# Patient Record
Sex: FEMALE | Race: WHITE | Employment: UNEMPLOYED | URBAN - METROPOLITAN AREA
[De-identification: names, ages, dates, MRNs, and addresses within clinical notes are randomized per-mention and may not be internally consistent; named-entity substitution may affect disease eponyms.]

---

## 2021-04-05 ENCOUNTER — IMMUNIZATION (OUTPATIENT)
Dept: PRIMARY CARE CLINIC | Age: 36
End: 2021-04-05
Payer: MEDICARE

## 2021-04-05 PROCEDURE — 0001A COVID-19, PFIZER VACCINE 30MCG/0.3ML DOSE: CPT | Performed by: CLINICAL NURSE SPECIALIST

## 2021-04-05 PROCEDURE — 91300 COVID-19, PFIZER VACCINE 30MCG/0.3ML DOSE: CPT | Performed by: CLINICAL NURSE SPECIALIST

## 2021-05-03 ENCOUNTER — IMMUNIZATION (OUTPATIENT)
Dept: PRIMARY CARE CLINIC | Age: 36
End: 2021-05-03
Payer: MEDICARE

## 2021-05-03 PROCEDURE — 91300 COVID-19, PFIZER VACCINE 30MCG/0.3ML DOSE: CPT | Performed by: NURSE PRACTITIONER

## 2021-05-03 PROCEDURE — 0002A COVID-19, PFIZER VACCINE 30MCG/0.3ML DOSE: CPT | Performed by: NURSE PRACTITIONER

## 2023-01-13 ENCOUNTER — HOSPITAL ENCOUNTER (OUTPATIENT)
Age: 38
Discharge: HOME | End: 2023-01-15

## 2023-01-13 PROCEDURE — 88305 TISSUE EXAM BY PATHOLOGIST: CPT

## 2023-01-13 PROCEDURE — 88342 IMHCHEM/IMCYTCHM 1ST ANTB: CPT

## 2023-01-20 ENCOUNTER — HOSPITAL ENCOUNTER (EMERGENCY)
Age: 38
Discharge: ANOTHER HOSPITAL | End: 2023-01-20
Attending: STUDENT IN AN ORGANIZED HEALTH CARE EDUCATION/TRAINING PROGRAM
Payer: MEDICARE

## 2023-01-20 ENCOUNTER — APPOINTMENT (OUTPATIENT)
Dept: GENERAL RADIOLOGY | Age: 38
End: 2023-01-20
Payer: MEDICARE

## 2023-01-20 VITALS
BODY MASS INDEX: 29.81 KG/M2 | DIASTOLIC BLOOD PRESSURE: 69 MMHG | HEART RATE: 78 BPM | WEIGHT: 162 LBS | TEMPERATURE: 98.8 F | HEIGHT: 62 IN | RESPIRATION RATE: 15 BRPM | SYSTOLIC BLOOD PRESSURE: 121 MMHG | OXYGEN SATURATION: 96 %

## 2023-01-20 DIAGNOSIS — S92.352A CLOSED FRACTURE OF BASE OF FIFTH METATARSAL BONE OF LEFT FOOT, INITIAL ENCOUNTER: ICD-10-CM

## 2023-01-20 DIAGNOSIS — S82.392A CLOSED INTRA-ARTICULAR FRACTURE OF DISTAL END OF LEFT TIBIA, INITIAL ENCOUNTER: Primary | ICD-10-CM

## 2023-01-20 PROCEDURE — 29515 APPLICATION SHORT LEG SPLINT: CPT

## 2023-01-20 PROCEDURE — 73610 X-RAY EXAM OF ANKLE: CPT

## 2023-01-20 PROCEDURE — 99285 EMERGENCY DEPT VISIT HI MDM: CPT

## 2023-01-20 PROCEDURE — 73630 X-RAY EXAM OF FOOT: CPT

## 2023-01-20 PROCEDURE — 6370000000 HC RX 637 (ALT 250 FOR IP): Performed by: PHYSICIAN ASSISTANT

## 2023-01-20 PROCEDURE — 73590 X-RAY EXAM OF LOWER LEG: CPT

## 2023-01-20 RX ORDER — OXYCODONE HYDROCHLORIDE AND ACETAMINOPHEN 5; 325 MG/1; MG/1
1 TABLET ORAL ONCE
Status: COMPLETED | OUTPATIENT
Start: 2023-01-20 | End: 2023-01-20

## 2023-01-20 RX ORDER — ACETAMINOPHEN 500 MG
1000 TABLET ORAL ONCE
Status: COMPLETED | OUTPATIENT
Start: 2023-01-20 | End: 2023-01-20

## 2023-01-20 RX ADMIN — OXYCODONE AND ACETAMINOPHEN 1 TABLET: 5; 325 TABLET ORAL at 21:45

## 2023-01-20 RX ADMIN — ACETAMINOPHEN 1000 MG: 500 TABLET ORAL at 16:02

## 2023-01-20 ASSESSMENT — PAIN DESCRIPTION - FREQUENCY: FREQUENCY: CONTINUOUS

## 2023-01-20 ASSESSMENT — PAIN - FUNCTIONAL ASSESSMENT: PAIN_FUNCTIONAL_ASSESSMENT: 0-10

## 2023-01-20 ASSESSMENT — PAIN DESCRIPTION - PAIN TYPE: TYPE: ACUTE PAIN

## 2023-01-20 ASSESSMENT — PAIN DESCRIPTION - ORIENTATION
ORIENTATION: LEFT
ORIENTATION: LEFT

## 2023-01-20 ASSESSMENT — PAIN SCALES - GENERAL
PAINLEVEL_OUTOF10: 8
PAINLEVEL_OUTOF10: 7
PAINLEVEL_OUTOF10: 8

## 2023-01-20 ASSESSMENT — PAIN DESCRIPTION - LOCATION
LOCATION: ANKLE
LOCATION: ANKLE

## 2023-01-20 ASSESSMENT — PAIN DESCRIPTION - DESCRIPTORS: DESCRIPTORS: SHARP

## 2023-01-20 NOTE — ED PROVIDER NOTES
Shared CLAUDE-ED Attending Visit. CC: No           [facility]  Department of Emergency Medicine   ED  Encounter Note  Admit Date/RoomTime: 2023  3:37 PM  ED Room: [place] [address]    NAME: Tabitha Del Castillo  : 1985  MRN: [id number]     Chief Complaint:  Fall (Mechanical fall yesterday and laid on the left side, denies hit head, No blood thinner  ) and Ankle Pain (Fell and hurt left the ankle , swelling and can't put weight on it since then )    History of Present Illness   Patient presented to ER accompanied by mother whom helps to act as historian based on patient's pre-existing developmental delay of Down syndrome. However, patient is verbal and able to answer questions. Tabitha Del Castillo is a 30 y.o. old female presenting to the emergency department by private vehicle, for non-traumatic Left ankle pain which occured 1 day(s) prior to arrival.  The complaint is due to stepping on a dog bone at home and twisting her ankle yesterday evening. Since onset the symptoms have been gradually worsening with inability to bear weight, stiffness, and swelling. Patient has no prior history of pain/injury with regards to today's visit. Her pain is aggraveated by certain movements or pressure on or palpation of painful area and relieved by OTC NSAIDS. She denies any head injury, headache, loss of consciousness, chest pain, abdominal pain, back pain, numbness, weakness, nausea, vomiting, fever, chills, wounds, or rash. Tetanus Status: up to date. ROS   Pertinent positives and negatives are stated within HPI, all other systems reviewed and are negative. Past Medical History:  has a past medical history of Chest discomfort, Down syndrome, Heartburn, and History of open heart surgery. Surgical History:  has a past surgical history that includes Cardiac surgery; Cholecystectomy; knee surgery; Tonsillectomy; and Cardiac valuve replacement ().     Social History:  reports that she has never smoked. She has never used smokeless tobacco. She reports that she does not drink alcohol and does not use drugs. Family History: family history includes Other in her father. Allergies: Patient has no known allergies. Physical Exam   Oxygen Saturation Interpretation: Normal.        ED Triage Vitals   BP Temp Temp Source Heart Rate Resp SpO2 Height Weight   01/20/23 1552 01/20/23 1555 01/20/23 1555 01/20/23 1552 01/20/23 1552 01/20/23 1552 01/20/23 1554 01/20/23 1554   (!) 124/94 98.8 °F (37.1 °C) Oral 87 15 100 % 5' 2\" (1.575 m) 162 lb (73.5 kg)       Physical Exam  Constitutional:  Alert, development consistent with age. Neck:  Normal ROM. Supple. Left Ankle: Lateral malleolus              Tenderness:  moderate. Swelling: Mild. Deformity: no deformity observed/palpated. ROM: diminished range with pain. Skin:  no wounds or erythema. Neurovascular: Motor deficit: none. Sensory deficit:   none. Pulse deficit: none. 2+ dorsalis pedis and posterior tibial pulses intact. Capillary refill: normal.  Is warm and well-perfused. Left Knee:              Tenderness:  none. Swelling: none. Deformity: no deformity observed/palpated. ROM: full range of motion. Skin:  no wounds, erythema, or swelling. Left Foot: fifth metatarsal dorsal and plantar aspect proximally              Tenderness:  moderate. Swelling: Mild. Deformity: no deformity observed/palpated. ROM: full range with pain. Skin:  no wounds or erythema  Gait:  unable to test due pain. Lymphatics: No lymphangitis or adenopathy noted. Neurological:  Oriented. Motor functions intact. Lab / Imaging Results   (All laboratory and radiology results have been personally reviewed by myself)  Labs:  No results found for this visit on 01/20/23. Imaging:   All Radiology results interpreted by Radiologist unless otherwise noted. XR TIBIA FIBULA LEFT (2 VIEWS)   Final Result   Soft tissue swelling at the ankle, oblique to spiral distal tibial fracture   with intra-articular extension. XR ANKLE LEFT (MIN 3 VIEWS)   Final Result   Oblique fracture through the distal fibula, suspicious for intra-articular   involvement. XR FOOT LEFT (MIN 3 VIEWS)   Final Result   No acute bony abnormalities. Partially visualized distal tibial fracture. ED Course / Medical Decision Making     Medications   acetaminophen (TYLENOL) tablet 1,000 mg (1,000 mg Oral Given 1/20/23 1602)   oxyCODONE-acetaminophen (PERCOCET) 5-325 MG per tablet 1 tablet (1 tablet Oral Given 1/20/23 8595)     Reevaluation:  Time: 530 PM  Results discussed with patient's mother and patient at bedside at this time as well as need for consult to orthopedic surgeon for recommendation. It was discussed via myself and the patient's mother at this time that patient's mother did not feel safe taking her home and caring for her if she has to be nonweightbearing as they do not have a wheelchair and there are steps at home. Patient cannot ambulate with crutches as she is a fall risk per mother. I am agreeable to this. Plan for consult to orthopedic surgeon for possible admit with follow-up in the hospital.    Time: 6:50 PM  Spoke with OR charge nurse, Benjamin Puentes, via telephone at this time whom did get in contact with Dr. Misael Mahoney as a surgical nurse as he is in a surgical case at this time. She was able to convey some information to the nurse however Dr. Denys Day stated that he would like to call back after surgery was completed for further recommendation on this case. Patient updated. Time: 8:30 PM  I spoke with Dr. Ryan Fuentes via telephone at this time for recommendation on this case.   Due to the patient's fracture as well as the likelihood of her to remain nonweightbearing with an external fixation being unlikely, Dr. Marlene Darnell did recommend transfer downtown for trauma consult at this time. Time: 9:01 PM  Spoke with the resident orthopedic provider for [facility] via telephone at this time. He did recommend transfer ED to ED for trauma consult through the ER as they do not cover for [facility]. Although, I did explain the situation with the patient being MRI with unlikelihood to remain nonweightbearing and recommendation transfer downtown to Ortho. Will update my attending and patient at this time. Time: 9:48 PM  Spoke with attending physician, Dr. Joe Hillman, at [facility] to discuss ED to ED transfer with trauma consult via orthopedics once the patient is downtown. Attending did verbally accept ED to ED transfer. Transfer line will set up BLS transport. Consults:   IP CONSULT TO ORTHOPEDIC SURGERY    Procedure(s):  PROCEDURE NOTE  1/20/23       Time: 6:23SV    SPLINT  APPLICATION  Risks, benefits and alternatives (for applicable procedures below) described. Performed By: nursing shaft supervised via myself. Indication:  fracture of left, distal tibia with intra-articular involvement and base of fifth metatarsal fracture. Procedure:   A short  left leg  Posterior (three-sided) splint was applied by RN supervised via myself. Patient is neurovascularly intact following procedure. The patient tolerated the procedure well. MDM:       Patient is a 72-year-old female who presented to the ER via ambulance for left ankle pain sustained 1 day prior to arrival due to rolling injury at home. Patient did present with her mother who also acts as historian as patient does have history of MR. Patient also has significant past medical history of valve replacement, cholecystectomy. Based on reported history and physical exam findings differentials include ankle fracture, foot fracture, sprained ankle, sprain foot, contusion.   X-ray imaging of the patient's left foot demonstrated no acute bony abnormalities with a partially visualized distal tibial fracture as read by radiology. However, on wet read via myself I did appreciate a fifth metatarsal fracture consistent with Concepcion fracture. This is consistent with patient's physical exam findings. X imaging of the left ankle and left tib-fib demonstrated oblique to spiral distal tibial fracture with intra-articular extension with lateral soft tissue swelling as read by radiology. All results were discussed with patient and patient's mother prior to disposition all questions were answered. Several consults were placed out to both orthopedics and ED attending at [facility], as noted above, for further evaluation of the patient. Patient was ultimately transferred ED to ED for trauma consult within the ER at [facility] for recommendation of orthopedics with verbal acceptance via ED attending at [facility]. Patient was placed in a splint via nursing staff supervised by myself for support. Patient is alert and oriented, no acute distress, afebrile, nontachycardic, nonhypoxic and nontachypneic. Patient stable upon transfer order placement. Plan of Care/Counseling:  LG Guan and EM Attending Physician reviewed today's visit with the patient and mother in addition to providing specific details for the plan of care and counseling regarding the diagnosis and prognosis. Questions are answered at this time and are agreeable with the plan. Assessment      1. Closed intra-articular fracture of distal end of left tibia, initial encounter    2. Closed fracture of base of fifth metatarsal bone of left foot, initial encounter      Plan   Transferred to [address] [place] [address] Emergency Department (ER-to-ER).   Patient condition is stable    New Medications     New Prescriptions    No medications on file     Electronically signed by LG Guan   DD: 1/20/23  **This report was transcribed using voice recognition software. Every effort was made to ensure accuracy; however, inadvertent computerized transcription errors may be present.   END OF ED PROVIDER NOTE      Verner Lyons, [place]  01/20/23 35 Alfredo Asif [place]  01/20/23 2145

## 2023-01-20 NOTE — ED NOTES
Ice bag applied        Anuradha Baxter, RN  01/20/23 771 [facility], RN  01/20/23 3545

## 2023-01-21 ENCOUNTER — HOSPITAL ENCOUNTER (INPATIENT)
Age: 38
LOS: 1 days | Discharge: NURSING, REHAB OR LONG-TERM CARE | DRG: 494 | End: 2023-01-25
Attending: EMERGENCY MEDICINE | Admitting: ORTHOPAEDIC SURGERY
Payer: MEDICARE

## 2023-01-21 ENCOUNTER — ANESTHESIA EVENT (OUTPATIENT)
Dept: OPERATING ROOM | Age: 38
End: 2023-01-21
Payer: MEDICARE

## 2023-01-21 ENCOUNTER — APPOINTMENT (OUTPATIENT)
Dept: GENERAL RADIOLOGY | Age: 38
DRG: 494 | End: 2023-01-21
Payer: MEDICARE

## 2023-01-21 ENCOUNTER — APPOINTMENT (OUTPATIENT)
Dept: CT IMAGING | Age: 38
DRG: 494 | End: 2023-01-21
Payer: MEDICARE

## 2023-01-21 ENCOUNTER — ANESTHESIA (OUTPATIENT)
Dept: OPERATING ROOM | Age: 38
End: 2023-01-21
Payer: MEDICARE

## 2023-01-21 DIAGNOSIS — S82.402A CLOSED FRACTURE OF LEFT TIBIA AND FIBULA, INITIAL ENCOUNTER: Primary | ICD-10-CM

## 2023-01-21 DIAGNOSIS — S82.202A CLOSED FRACTURE OF LEFT TIBIA AND FIBULA, INITIAL ENCOUNTER: Primary | ICD-10-CM

## 2023-01-21 PROBLEM — S82.871A CLOSED DISPLACED PILON FRACTURE OF RIGHT TIBIA: Status: ACTIVE | Noted: 2023-01-21

## 2023-01-21 PROBLEM — S82.872A CLOSED LEFT PILON FRACTURE, INITIAL ENCOUNTER: Status: ACTIVE | Noted: 2023-01-21

## 2023-01-21 LAB
ABO/RH: NORMAL
ABO/RH: NORMAL
ANTIBODY SCREEN: NORMAL
APTT: 32.1 SEC (ref 24.5–35.1)
HCG QUALITATIVE: NEGATIVE
HCT VFR BLD CALC: 36 % (ref 34–48)
HEMOGLOBIN: 12 G/DL (ref 11.5–15.5)
INR BLD: 1.1
MCH RBC QN AUTO: 29.7 PG (ref 26–35)
MCHC RBC AUTO-ENTMCNC: 33.3 % (ref 32–34.5)
MCV RBC AUTO: 89.1 FL (ref 80–99.9)
PDW BLD-RTO: 14.6 FL (ref 11.5–15)
PLATELET # BLD: 298 E9/L (ref 130–450)
PMV BLD AUTO: 9.8 FL (ref 7–12)
PROTHROMBIN TIME: 12.4 SEC (ref 9.3–12.4)
RBC # BLD: 4.04 E12/L (ref 3.5–5.5)
WBC # BLD: 5.9 E9/L (ref 4.5–11.5)

## 2023-01-21 PROCEDURE — C1713 ANCHOR/SCREW BN/BN,TIS/BN: HCPCS | Performed by: ORTHOPAEDIC SURGERY

## 2023-01-21 PROCEDURE — 6360000002 HC RX W HCPCS: Performed by: ANESTHESIOLOGY

## 2023-01-21 PROCEDURE — 6360000002 HC RX W HCPCS: Performed by: EMERGENCY MEDICINE

## 2023-01-21 PROCEDURE — 73700 CT LOWER EXTREMITY W/O DYE: CPT

## 2023-01-21 PROCEDURE — 86900 BLOOD TYPING SEROLOGIC ABO: CPT

## 2023-01-21 PROCEDURE — 73590 X-RAY EXAM OF LOWER LEG: CPT

## 2023-01-21 PROCEDURE — 84703 CHORIONIC GONADOTROPIN ASSAY: CPT

## 2023-01-21 PROCEDURE — 6360000002 HC RX W HCPCS

## 2023-01-21 PROCEDURE — 7100000001 HC PACU RECOVERY - ADDTL 15 MIN: Performed by: ORTHOPAEDIC SURGERY

## 2023-01-21 PROCEDURE — 3600000015 HC SURGERY LEVEL 5 ADDTL 15MIN: Performed by: ORTHOPAEDIC SURGERY

## 2023-01-21 PROCEDURE — 6370000000 HC RX 637 (ALT 250 FOR IP): Performed by: EMERGENCY MEDICINE

## 2023-01-21 PROCEDURE — 6360000002 HC RX W HCPCS: Performed by: STUDENT IN AN ORGANIZED HEALTH CARE EDUCATION/TRAINING PROGRAM

## 2023-01-21 PROCEDURE — 0QSH04Z REPOSITION LEFT TIBIA WITH INTERNAL FIXATION DEVICE, OPEN APPROACH: ICD-10-PCS | Performed by: ORTHOPAEDIC SURGERY

## 2023-01-21 PROCEDURE — 3600000005 HC SURGERY LEVEL 5 BASE: Performed by: ORTHOPAEDIC SURGERY

## 2023-01-21 PROCEDURE — 85730 THROMBOPLASTIN TIME PARTIAL: CPT

## 2023-01-21 PROCEDURE — G0378 HOSPITAL OBSERVATION PER HR: HCPCS

## 2023-01-21 PROCEDURE — 3700000000 HC ANESTHESIA ATTENDED CARE: Performed by: ORTHOPAEDIC SURGERY

## 2023-01-21 PROCEDURE — 99223 1ST HOSP IP/OBS HIGH 75: CPT | Performed by: ORTHOPAEDIC SURGERY

## 2023-01-21 PROCEDURE — 6370000000 HC RX 637 (ALT 250 FOR IP): Performed by: ORTHOPAEDIC SURGERY

## 2023-01-21 PROCEDURE — 2580000003 HC RX 258

## 2023-01-21 PROCEDURE — 85610 PROTHROMBIN TIME: CPT

## 2023-01-21 PROCEDURE — 2580000003 HC RX 258: Performed by: STUDENT IN AN ORGANIZED HEALTH CARE EDUCATION/TRAINING PROGRAM

## 2023-01-21 PROCEDURE — 86850 RBC ANTIBODY SCREEN: CPT

## 2023-01-21 PROCEDURE — 7100000000 HC PACU RECOVERY - FIRST 15 MIN: Performed by: ORTHOPAEDIC SURGERY

## 2023-01-21 PROCEDURE — 85027 COMPLETE CBC AUTOMATED: CPT

## 2023-01-21 PROCEDURE — 2720000010 HC SURG SUPPLY STERILE: Performed by: ORTHOPAEDIC SURGERY

## 2023-01-21 PROCEDURE — 96374 THER/PROPH/DIAG INJ IV PUSH: CPT

## 2023-01-21 PROCEDURE — 86901 BLOOD TYPING SEROLOGIC RH(D): CPT

## 2023-01-21 PROCEDURE — 3209999900 FLUORO FOR SURGICAL PROCEDURES

## 2023-01-21 PROCEDURE — 6370000000 HC RX 637 (ALT 250 FOR IP): Performed by: STUDENT IN AN ORGANIZED HEALTH CARE EDUCATION/TRAINING PROGRAM

## 2023-01-21 PROCEDURE — 3700000001 HC ADD 15 MINUTES (ANESTHESIA): Performed by: ORTHOPAEDIC SURGERY

## 2023-01-21 PROCEDURE — 27827 TREAT LOWER LEG FRACTURE: CPT | Performed by: ORTHOPAEDIC SURGERY

## 2023-01-21 PROCEDURE — 2709999900 HC NON-CHARGEABLE SUPPLY: Performed by: ORTHOPAEDIC SURGERY

## 2023-01-21 PROCEDURE — 73610 X-RAY EXAM OF ANKLE: CPT

## 2023-01-21 PROCEDURE — 02HV33Z INSERTION OF INFUSION DEVICE INTO SUPERIOR VENA CAVA, PERCUTANEOUS APPROACH: ICD-10-PCS | Performed by: ORTHOPAEDIC SURGERY

## 2023-01-21 PROCEDURE — 99285 EMERGENCY DEPT VISIT HI MDM: CPT

## 2023-01-21 PROCEDURE — 2500000003 HC RX 250 WO HCPCS

## 2023-01-21 DEVICE — IMPLANTABLE DEVICE: Type: IMPLANTABLE DEVICE | Site: TIBIA | Status: FUNCTIONAL

## 2023-01-21 DEVICE — SCREW BNE L28MM DIA3.5MM CORT S STL ST NONCANNULATED LOK: Type: IMPLANTABLE DEVICE | Site: TIBIA | Status: FUNCTIONAL

## 2023-01-21 DEVICE — SCREW BNE L22MM DIA3.5MM CORT S STL ST NONCANNULATED LOK: Type: IMPLANTABLE DEVICE | Site: TIBIA | Status: FUNCTIONAL

## 2023-01-21 DEVICE — SCREW BNE L24MM DIA3.5MM CORT S STL ST NONCANNULATED LOK: Type: IMPLANTABLE DEVICE | Site: TIBIA | Status: FUNCTIONAL

## 2023-01-21 DEVICE — SCREW BNE L30MM DIA3.5MM CORT S STL ST LOK FULL THRD: Type: IMPLANTABLE DEVICE | Site: TIBIA | Status: FUNCTIONAL

## 2023-01-21 DEVICE — SCREW BNE L26MM DIA3.5MM CORT S STL ST NONCANNULATED LOK: Type: IMPLANTABLE DEVICE | Site: TIBIA | Status: FUNCTIONAL

## 2023-01-21 DEVICE — SCREW BNE L14MM DIA3.5MM CORT S STL ST LOK FULL THRD: Type: IMPLANTABLE DEVICE | Site: TIBIA | Status: FUNCTIONAL

## 2023-01-21 RX ORDER — SODIUM CHLORIDE 0.9 % (FLUSH) 0.9 %
5-40 SYRINGE (ML) INJECTION EVERY 12 HOURS SCHEDULED
Status: DISCONTINUED | OUTPATIENT
Start: 2023-01-21 | End: 2023-01-21 | Stop reason: HOSPADM

## 2023-01-21 RX ORDER — FENTANYL CITRATE 50 UG/ML
INJECTION, SOLUTION INTRAMUSCULAR; INTRAVENOUS PRN
Status: DISCONTINUED | OUTPATIENT
Start: 2023-01-21 | End: 2023-01-21 | Stop reason: SDUPTHER

## 2023-01-21 RX ORDER — LIDOCAINE HYDROCHLORIDE 20 MG/ML
INJECTION, SOLUTION INTRAVENOUS PRN
Status: DISCONTINUED | OUTPATIENT
Start: 2023-01-21 | End: 2023-01-21 | Stop reason: SDUPTHER

## 2023-01-21 RX ORDER — NEOSTIGMINE METHYLSULFATE 1 MG/ML
INJECTION, SOLUTION INTRAVENOUS PRN
Status: DISCONTINUED | OUTPATIENT
Start: 2023-01-21 | End: 2023-01-21 | Stop reason: SDUPTHER

## 2023-01-21 RX ORDER — SODIUM CHLORIDE 0.9 % (FLUSH) 0.9 %
5-40 SYRINGE (ML) INJECTION PRN
Status: DISCONTINUED | OUTPATIENT
Start: 2023-01-21 | End: 2023-01-25 | Stop reason: HOSPADM

## 2023-01-21 RX ORDER — MORPHINE SULFATE 2 MG/ML
2 INJECTION, SOLUTION INTRAMUSCULAR; INTRAVENOUS
Status: DISCONTINUED | OUTPATIENT
Start: 2023-01-21 | End: 2023-01-25 | Stop reason: HOSPADM

## 2023-01-21 RX ORDER — DEXAMETHASONE SODIUM PHOSPHATE 10 MG/ML
INJECTION INTRAMUSCULAR; INTRAVENOUS PRN
Status: DISCONTINUED | OUTPATIENT
Start: 2023-01-21 | End: 2023-01-21 | Stop reason: SDUPTHER

## 2023-01-21 RX ORDER — ASPIRIN 81 MG/1
81 TABLET ORAL 2 TIMES DAILY
Qty: 56 TABLET | Refills: 0 | Status: SHIPPED | OUTPATIENT
Start: 2023-01-21 | End: 2023-02-18

## 2023-01-21 RX ORDER — SODIUM CHLORIDE 0.9 % (FLUSH) 0.9 %
5-40 SYRINGE (ML) INJECTION PRN
Status: DISCONTINUED | OUTPATIENT
Start: 2023-01-21 | End: 2023-01-21 | Stop reason: HOSPADM

## 2023-01-21 RX ORDER — KETOROLAC TROMETHAMINE 30 MG/ML
15 INJECTION, SOLUTION INTRAMUSCULAR; INTRAVENOUS ONCE
Status: COMPLETED | OUTPATIENT
Start: 2023-01-21 | End: 2023-01-21

## 2023-01-21 RX ORDER — ONDANSETRON 2 MG/ML
4 INJECTION INTRAMUSCULAR; INTRAVENOUS
Status: COMPLETED | OUTPATIENT
Start: 2023-01-21 | End: 2023-01-21

## 2023-01-21 RX ORDER — ASPIRIN 81 MG/1
81 TABLET, CHEWABLE ORAL DAILY
Status: DISCONTINUED | OUTPATIENT
Start: 2023-01-21 | End: 2023-01-25 | Stop reason: HOSPADM

## 2023-01-21 RX ORDER — SODIUM CHLORIDE 9 MG/ML
INJECTION, SOLUTION INTRAVENOUS PRN
Status: DISCONTINUED | OUTPATIENT
Start: 2023-01-21 | End: 2023-01-21 | Stop reason: HOSPADM

## 2023-01-21 RX ORDER — PROPOFOL 10 MG/ML
INJECTION, EMULSION INTRAVENOUS PRN
Status: DISCONTINUED | OUTPATIENT
Start: 2023-01-21 | End: 2023-01-21 | Stop reason: SDUPTHER

## 2023-01-21 RX ORDER — OXYCODONE HYDROCHLORIDE AND ACETAMINOPHEN 5; 325 MG/1; MG/1
1 TABLET ORAL EVERY 6 HOURS PRN
Qty: 28 TABLET | Refills: 0 | Status: SHIPPED | OUTPATIENT
Start: 2023-01-21 | End: 2023-01-28

## 2023-01-21 RX ORDER — ONDANSETRON 2 MG/ML
INJECTION INTRAMUSCULAR; INTRAVENOUS PRN
Status: DISCONTINUED | OUTPATIENT
Start: 2023-01-21 | End: 2023-01-21 | Stop reason: SDUPTHER

## 2023-01-21 RX ORDER — ROCURONIUM BROMIDE 10 MG/ML
INJECTION, SOLUTION INTRAVENOUS PRN
Status: DISCONTINUED | OUTPATIENT
Start: 2023-01-21 | End: 2023-01-21 | Stop reason: SDUPTHER

## 2023-01-21 RX ORDER — GLYCOPYRROLATE 0.2 MG/ML
INJECTION INTRAMUSCULAR; INTRAVENOUS PRN
Status: DISCONTINUED | OUTPATIENT
Start: 2023-01-21 | End: 2023-01-21 | Stop reason: SDUPTHER

## 2023-01-21 RX ORDER — OXYCODONE HYDROCHLORIDE 5 MG/1
5 TABLET ORAL EVERY 4 HOURS PRN
Status: DISCONTINUED | OUTPATIENT
Start: 2023-01-21 | End: 2023-01-25 | Stop reason: HOSPADM

## 2023-01-21 RX ORDER — ACETAMINOPHEN 325 MG/1
650 TABLET ORAL EVERY 6 HOURS
Status: DISCONTINUED | OUTPATIENT
Start: 2023-01-21 | End: 2023-01-25 | Stop reason: HOSPADM

## 2023-01-21 RX ORDER — SODIUM CHLORIDE 9 MG/ML
INJECTION, SOLUTION INTRAVENOUS CONTINUOUS PRN
Status: DISCONTINUED | OUTPATIENT
Start: 2023-01-21 | End: 2023-01-21 | Stop reason: SDUPTHER

## 2023-01-21 RX ORDER — PANTOPRAZOLE SODIUM 40 MG/1
40 TABLET, DELAYED RELEASE ORAL
Status: DISCONTINUED | OUTPATIENT
Start: 2023-01-22 | End: 2023-01-25 | Stop reason: HOSPADM

## 2023-01-21 RX ORDER — ONDANSETRON 4 MG/1
4 TABLET, ORALLY DISINTEGRATING ORAL EVERY 8 HOURS PRN
Status: DISCONTINUED | OUTPATIENT
Start: 2023-01-21 | End: 2023-01-25 | Stop reason: HOSPADM

## 2023-01-21 RX ORDER — SODIUM CHLORIDE 0.9 % (FLUSH) 0.9 %
5-40 SYRINGE (ML) INJECTION EVERY 12 HOURS SCHEDULED
Status: DISCONTINUED | OUTPATIENT
Start: 2023-01-21 | End: 2023-01-25 | Stop reason: HOSPADM

## 2023-01-21 RX ORDER — ONDANSETRON 2 MG/ML
4 INJECTION INTRAMUSCULAR; INTRAVENOUS EVERY 6 HOURS PRN
Status: DISCONTINUED | OUTPATIENT
Start: 2023-01-21 | End: 2023-01-25 | Stop reason: HOSPADM

## 2023-01-21 RX ORDER — BACITRACIN ZINC 500 [USP'U]/G
OINTMENT TOPICAL PRN
Status: DISCONTINUED | OUTPATIENT
Start: 2023-01-21 | End: 2023-01-21 | Stop reason: ALTCHOICE

## 2023-01-21 RX ORDER — SODIUM CHLORIDE 9 MG/ML
INJECTION, SOLUTION INTRAVENOUS PRN
Status: DISCONTINUED | OUTPATIENT
Start: 2023-01-21 | End: 2023-01-25 | Stop reason: HOSPADM

## 2023-01-21 RX ORDER — MORPHINE SULFATE 4 MG/ML
4 INJECTION, SOLUTION INTRAMUSCULAR; INTRAVENOUS
Status: DISCONTINUED | OUTPATIENT
Start: 2023-01-21 | End: 2023-01-25 | Stop reason: HOSPADM

## 2023-01-21 RX ORDER — ACETAMINOPHEN 500 MG
1000 TABLET ORAL
Status: COMPLETED | OUTPATIENT
Start: 2023-01-21 | End: 2023-01-21

## 2023-01-21 RX ORDER — MIDAZOLAM HYDROCHLORIDE 1 MG/ML
INJECTION INTRAMUSCULAR; INTRAVENOUS PRN
Status: DISCONTINUED | OUTPATIENT
Start: 2023-01-21 | End: 2023-01-21 | Stop reason: SDUPTHER

## 2023-01-21 RX ORDER — OXYCODONE HYDROCHLORIDE 10 MG/1
10 TABLET ORAL EVERY 4 HOURS PRN
Status: DISCONTINUED | OUTPATIENT
Start: 2023-01-21 | End: 2023-01-25 | Stop reason: HOSPADM

## 2023-01-21 RX ADMIN — PROPOFOL 30 MG: 10 INJECTION, EMULSION INTRAVENOUS at 11:25

## 2023-01-21 RX ADMIN — GLYCOPYRROLATE 0.6 MG: 0.2 INJECTION INTRAMUSCULAR; INTRAVENOUS at 11:16

## 2023-01-21 RX ADMIN — ACETAMINOPHEN 1000 MG: 500 TABLET ORAL at 05:49

## 2023-01-21 RX ADMIN — SODIUM CHLORIDE: 9 INJECTION, SOLUTION INTRAVENOUS at 09:35

## 2023-01-21 RX ADMIN — PROPOFOL 170 MG: 10 INJECTION, EMULSION INTRAVENOUS at 09:45

## 2023-01-21 RX ADMIN — HYDROMORPHONE HYDROCHLORIDE 0.5 MG: 1 INJECTION, SOLUTION INTRAMUSCULAR; INTRAVENOUS; SUBCUTANEOUS at 12:05

## 2023-01-21 RX ADMIN — OXYCODONE HYDROCHLORIDE 10 MG: 10 TABLET ORAL at 19:54

## 2023-01-21 RX ADMIN — FENTANYL CITRATE 50 MCG: 50 INJECTION, SOLUTION INTRAMUSCULAR; INTRAVENOUS at 11:50

## 2023-01-21 RX ADMIN — ACETAMINOPHEN 650 MG: 325 TABLET ORAL at 19:53

## 2023-01-21 RX ADMIN — LIDOCAINE HYDROCHLORIDE 60 MG: 20 INJECTION, SOLUTION INTRAVENOUS at 09:45

## 2023-01-21 RX ADMIN — ONDANSETRON 4 MG: 2 INJECTION INTRAMUSCULAR; INTRAVENOUS at 12:20

## 2023-01-21 RX ADMIN — ROCURONIUM BROMIDE 40 MG: 10 INJECTION, SOLUTION INTRAVENOUS at 09:45

## 2023-01-21 RX ADMIN — FENTANYL CITRATE 50 MCG: 50 INJECTION, SOLUTION INTRAMUSCULAR; INTRAVENOUS at 09:42

## 2023-01-21 RX ADMIN — ASPIRIN 81 MG CHEWABLE TABLET 81 MG: 81 TABLET CHEWABLE at 19:53

## 2023-01-21 RX ADMIN — Medication 3 MG: at 11:16

## 2023-01-21 RX ADMIN — HYDROMORPHONE HYDROCHLORIDE 0.5 MG: 1 INJECTION, SOLUTION INTRAMUSCULAR; INTRAVENOUS; SUBCUTANEOUS at 13:00

## 2023-01-21 RX ADMIN — KETOROLAC TROMETHAMINE 15 MG: 30 INJECTION, SOLUTION INTRAMUSCULAR; INTRAVENOUS at 02:28

## 2023-01-21 RX ADMIN — DEXAMETHASONE SODIUM PHOSPHATE 10 MG: 10 INJECTION INTRAMUSCULAR; INTRAVENOUS at 10:05

## 2023-01-21 RX ADMIN — FENTANYL CITRATE 100 MCG: 50 INJECTION, SOLUTION INTRAMUSCULAR; INTRAVENOUS at 09:45

## 2023-01-21 RX ADMIN — FENTANYL CITRATE 25 MCG: 50 INJECTION, SOLUTION INTRAMUSCULAR; INTRAVENOUS at 11:45

## 2023-01-21 RX ADMIN — ONDANSETRON HYDROCHLORIDE 4 MG: 2 SOLUTION INTRAMUSCULAR; INTRAVENOUS at 11:14

## 2023-01-21 RX ADMIN — PROPOFOL 30 MG: 10 INJECTION, EMULSION INTRAVENOUS at 11:00

## 2023-01-21 RX ADMIN — FENTANYL CITRATE 25 MCG: 50 INJECTION, SOLUTION INTRAMUSCULAR; INTRAVENOUS at 11:28

## 2023-01-21 RX ADMIN — CEFAZOLIN 1000 MG: 1 INJECTION, POWDER, FOR SOLUTION INTRAMUSCULAR; INTRAVENOUS at 17:13

## 2023-01-21 RX ADMIN — MIDAZOLAM 1 MG: 1 INJECTION INTRAMUSCULAR; INTRAVENOUS at 09:42

## 2023-01-21 RX ADMIN — CEFAZOLIN 2000 MG: 2 INJECTION, POWDER, FOR SOLUTION INTRAMUSCULAR; INTRAVENOUS at 10:05

## 2023-01-21 RX ADMIN — SODIUM CHLORIDE, PRESERVATIVE FREE 10 ML: 5 INJECTION INTRAVENOUS at 19:57

## 2023-01-21 ASSESSMENT — PAIN DESCRIPTION - LOCATION
LOCATION: LEG
LOCATION: LEG
LOCATION: ANKLE
LOCATION: ANKLE
LOCATION: LEG
LOCATION: ANKLE
LOCATION: LEG

## 2023-01-21 ASSESSMENT — LIFESTYLE VARIABLES
HOW OFTEN DO YOU HAVE A DRINK CONTAINING ALCOHOL: NEVER
HOW MANY STANDARD DRINKS CONTAINING ALCOHOL DO YOU HAVE ON A TYPICAL DAY: PATIENT DOES NOT DRINK

## 2023-01-21 ASSESSMENT — PAIN DESCRIPTION - ORIENTATION
ORIENTATION: LEFT
ORIENTATION: LEFT;LOWER
ORIENTATION: LEFT
ORIENTATION: LEFT;LOWER
ORIENTATION: LEFT;LOWER
ORIENTATION: LEFT
ORIENTATION: LEFT;LOWER

## 2023-01-21 ASSESSMENT — PAIN - FUNCTIONAL ASSESSMENT
PAIN_FUNCTIONAL_ASSESSMENT: NONE - DENIES PAIN
PAIN_FUNCTIONAL_ASSESSMENT: WONG-BAKER FACES

## 2023-01-21 ASSESSMENT — PAIN SCALES - GENERAL
PAINLEVEL_OUTOF10: 7
PAINLEVEL_OUTOF10: 0
PAINLEVEL_OUTOF10: 7
PAINLEVEL_OUTOF10: 7
PAINLEVEL_OUTOF10: 0

## 2023-01-21 ASSESSMENT — PAIN DESCRIPTION - FREQUENCY: FREQUENCY: CONTINUOUS

## 2023-01-21 ASSESSMENT — PAIN SCALES - WONG BAKER
WONGBAKER_NUMERICALRESPONSE: 6
WONGBAKER_NUMERICALRESPONSE: 2
WONGBAKER_NUMERICALRESPONSE: 6
WONGBAKER_NUMERICALRESPONSE: 2
WONGBAKER_NUMERICALRESPONSE: 8
WONGBAKER_NUMERICALRESPONSE: 0
WONGBAKER_NUMERICALRESPONSE: 0

## 2023-01-21 ASSESSMENT — PAIN DESCRIPTION - DESCRIPTORS
DESCRIPTORS: PATIENT UNABLE TO DESCRIBE
DESCRIPTORS: PATIENT UNABLE TO DESCRIBE

## 2023-01-21 ASSESSMENT — PAIN DESCRIPTION - PAIN TYPE
TYPE: ACUTE PAIN
TYPE: SURGICAL PAIN

## 2023-01-21 NOTE — LETTER
[place] Department Medicaid  CERTIFICATION OF NECESSITY  FOR NON-EMERGENCY TRANSPORTATION   BY GROUND AMBULANCE      Individual Information   1. Name: Jace Cadet 2. [place] Medicaid Billing Number:    3. Address: [address] [place], [place] [address]      Transportation Provider Information   4. Provider Name:    5. [place] Medicaid Provider Number:  National Provider Identifier (NPI):      Certification  7. Criteria:  During transport, this individual requires:  [x] Medical treatment or continuous     supervision by an EMT. [] The administration or regulation of oxygen by another person. [] Supervised protective restraint. 8. Period Beginning Date:    5. Length  [x] Not more than 1day(s)  [] One Year     Additional Information Relevant to Certification   10. Comments or Explanations, If Necessary or Appropriate   L TIB/FIB FX, NWB LLE, HIGH FALL RISK HX OF DOWN SYNDROME(A+O X 2)     Certifying Practitioner Information   11. Name of Practitioner: DR. Jayden MARIO MD   12. [place] Medicaid Provider Number, If Applicable:  [address] Provider Identifier (NPI):      Signature Information   14. Date of Signature:  13. Name of Person Signin. Signature and Professional Designation:      [facility] [id number]  Rev. 2015       [id number]  89[facility] Encounter Date/Time: 2023 [address] [place] [address] [place] Account: [de-identified]    MRN: [id number]    Patient: Jace Cadet    Contact Serial #: [id number]      ENCOUNTER          Patient Class: I Private Enc?   No Unit RM BD: [facility] Service: ORT   Encounter DX: Closed fracture of left *   ADM Provider: Larisa Mario, *   Procedure:     ATT Provider: Larisa Mario, *   REF Provider:        Admission DX: Closed fracture of left tibia and fibula, initial encounter, Tibia/fibula fracture, shaft, left, closed, initial encounter, Closed left pilon fracture, initial encounter and DX codes: S82.202A, S82.402A, S82.202A, S82.402A, K46.792Z    PATIENT  Name: Mily Messina : 1985 (37 yrs)   Address: [place] [address] Sex: Female   [place] city: [address] [place] [address]         Marital Status: Single   Employer: DISABLED         Methodist: [employer]   Primary Care Provider: Jared Newman III, DO         Primary Phone: [phone]   EMERGENCY CONTACT   Contact Name Legal Guardian? Relationship to Patient Home Phone Work Phone   1. Vero Esquivel Yes    Parent  Other                   GUARANTOR            Guarantor: Mily Messina     : 1985   Address: [place] [address] Sex: Female   [place] [id number]     Relation to Patient: Self       Home Phone: [phone]   Guarantor ID: [id number]       Work Phone:     Guarantor Employer: NOT EMPLOYED         Status: NOT EMPLO*      COVERAGE        PRIMARY INSURANCE   Payor: [facility] MEDICARE Plan: Maxine GUZMAN*   Payor Address: ,          Group Number: [id number] Insurance Type: INDEMNITY   Subscriber Name: Rosamaria Be : 1985   Subscriber ID: [id number] Pat. Rel. to Sub: Self   SECONDARY INSURANCE   Payor:   Plan:     Payor Address:  ,           Group Number:   Insurance Type:     Subscriber Name:   Subscriber :     Subscriber ID:   Pat.  Rel. to Sub:        CSN: [id number]

## 2023-01-21 NOTE — ED PROVIDER NOTES
[address]        Pt Name: Nikky Holt  MRN: [id number]  [place] 1985  Date of evaluation: 1/21/2023  Provider: Bradley Leigh DO  PCP: Joseph Valdez III, DO  Note Started: 12:22 AM EST 1/21/23    CHIEF COMPLAINT       Chief Complaint   Patient presents with    Ankle Pain     Pt a transfer from [address] [place] [address] for a left ankle fx from fall on Thursday tripped over a dog bone. HISTORY OF PRESENT ILLNESS: 1 or more Elements        Limitations to history : Down syndrome    Nikky Holt is a 30 y.o. female who presents as a transfer from [facility] for evaluation of left distal tibia spiral fracture. She arrives in a splint, neurovascularly intact. She has history of Down syndrome but is communicative and interactive. She denies having any significant pain at this time. No pain to the knee or hip. Awaiting mother's arrival for further narrative. Nursing Notes were all reviewed and agreed with or any disagreements were addressed in the HPI. REVIEW OF EXTERNAL NOTE :       Review of imaging from [facility] ED, minimally displaced left distal tibia spiral fracture. REVIEW OF SYSTEMS :      Positives and Pertinent negatives as per HPI. SURGICAL HISTORY     Past Surgical History:   Procedure Laterality Date    CARDIAC SURGERY      CARDIAC VALVE SURGERY  1985    5 months old     CHOLECYSTECTOMY      KNEE SURGERY      TONSILLECTOMY         CURRENTMEDICATIONS       Previous Medications    DEXILANT 60 MG CPDR CAPSULE    60 mg daily       ALLERGIES     Patient has no known allergies.     FAMILYHISTORY       Family History   Problem Relation Age of Onset    Other Father         Suicide        SOCIAL HISTORY       Social History     Tobacco Use    Smoking status: Never    Smokeless tobacco: Never   Substance Use Topics    Alcohol use: No    Drug use: No       SCREENINGS        [place]y Coma Scale  Eye Opening: Spontaneous  Best Verbal Response: Oriented  Best Motor Response: Obeys commands  [place] Coma Scale Score: 15                CIWA Assessment  BP: 120/76  Heart Rate: 83           PHYSICAL EXAM  1 or more Elements     ED Triage Vitals [01/21/23 0012]   BP Temp Temp Source Heart Rate Resp SpO2 Height Weight   127/84 97.2 °F (36.2 °C) Oral 92 16 98 % 5' 2\" (1.575 m) 162 lb (73.5 kg)             Constitutional/General: Alert and oriented x3  Head: Normocephalic and atraumatic  Eyes: PERRL, EOMI, conjunctiva normal, sclera non icteric  ENT:  Oropharynx clear, handling secretions, no trismus, no asymmetry of the posterior oropharynx or uvular edema  Neck: Supple, full ROM, no stridor, no meningeal signs  Respiratory: Lungs clear to auscultation bilaterally, no wheezes, rales, or rhonchi. Not in respiratory distress  Cardiovascular:  Regular rate. Regular rhythm. No murmurs, no gallops, no rubs. 2+ distal pulses. Equal extremity pulses. Chest: No chest wall tenderness  GI:  Abdomen Soft, Non tender, Non distended. +BS. No rebound, guarding, or rigidity. No pulsatile masses. Musculoskeletal: Moves all extremities x 4. Warm and well perfused, no clubbing, no cyanosis, no edema. Capillary refill <3 seconds. Left lower leg is splinted, neurovascularly intact distally with good cap refill  Integument: skin warm and dry. No rashes. Neurologic: GCS 15, no focal deficits, symmetric strength 5/5 in the upper and lower extremities bilaterally  Psychiatric: Normal Affect            DIAGNOSTIC RESULTS   LABS:    Labs Reviewed   APTT   PROTIME-INR   CBC   TYPE AND SCREEN       As interpreted by me, the above displayed labs are abnormal. All other labs obtained during this visit were within normal range or not returned as of this dictation.       RADIOLOGY:   Non-plain film images such as CT, Ultrasound and MRI are read by the radiologist. Plain radiographic images are visualized and preliminarily interpreted by the ED Provider with the findings documented in the ED Course. Interpretation per the Radiologist below, if available at the time of this note:    CT TIBIA FIBULA LEFT WO CONTRAST   Final Result   Oblique fracture distal tibial metadiaphysis extends 8.5 cm above the level   of the ankle mortise. RECOMMENDATIONS:   Careful clinical correlation and follow up recommended. CT ANKLE LEFT WO CONTRAST   Final Result   1. Distal tibial fracture with minimal displacement. 2. Ankle mortise otherwise intact. RECOMMENDATIONS:   Careful clinical correlation and follow up recommended. XR TIBIA FIBULA LEFT (2 VIEWS)    Result Date: 1/20/2023  EXAMINATION: 2 XRAY VIEWS OF THE LEFT TIBIA AND FIBULA 1/20/2023 4:49 pm COMPARISON: None. HISTORY: ORDERING SYSTEM PROVIDED HISTORY: fracture distal tibia, need to assess for more proximal fracture TECHNOLOGIST PROVIDED HISTORY: Reason for exam:->fracture distal tibia, need to assess for more proximal fracture FINDINGS: There is an oblique to spiral minimally comminuted fracture of the distal tibia extending to the articular surface. Consider pilon fracture. Proximal tibia and fibula appear intact. Mild degenerative changes at the knee joint with lateral subluxation of the patella. Moderate soft tissue swelling at the ankle. Soft tissue swelling at the ankle, oblique to spiral distal tibial fracture with intra-articular extension. XR ANKLE LEFT (MIN 3 VIEWS)    Result Date: 1/20/2023  EXAMINATION: THREE XRAY VIEWS OF THE LEFT ANKLE 1/20/2023 4:11 pm COMPARISON: None. HISTORY: ORDERING SYSTEM PROVIDED HISTORY: injury TECHNOLOGIST PROVIDED HISTORY: Reason for exam:->injury FINDINGS: There is oblique fracture through the visualized distal tibia. There is disruption of ankle mortise. Diffuse soft tissue swelling noted. Oblique fracture through the distal fibula, suspicious for intra-articular involvement.      XR FOOT LEFT (MIN 3 VIEWS)    Result Date: 1/20/2023  EXAMINATION: THREE XRAY VIEWS OF THE LEFT FOOT 1/20/2023 4:11 pm COMPARISON: None. HISTORY: ORDERING SYSTEM PROVIDED HISTORY: injury TECHNOLOGIST PROVIDED HISTORY: Reason for exam:->injury FINDINGS: Severe hallux valgus deformity. No acute fractures. Diffuse soft tissue swelling. No radiopaque foreign bodies. No acute bony abnormalities. Partially visualized distal tibial fracture. No results found. PROCEDURES   Unless otherwise noted below, none       CRITICAL CARE TIME (.cct)   None    PAST MEDICAL HISTORY/Chronic Conditions Affecting Care      has a past medical history of Chest discomfort, Down syndrome, Heartburn (06/01/2016), and History of open heart surgery. EMERGENCY DEPARTMENT COURSE    Vitals:    Vitals:    01/21/23 0012 01/21/23 0228 01/21/23 0610 01/21/23 0725   BP: 127/84 (!) 112/53 109/70 120/76   Pulse: 92 86 92 83   Resp: 16 14 14 14   Temp: 97.2 °F (36.2 °C) 98.3 °F (36.8 °C)     TempSrc: Oral Oral     SpO2: 98% 96% 100% 96%   Weight: 162 lb (73.5 kg)      Height: 5' 2\" (1.575 m)          Patient was given the following medications:  Medications   ceFAZolin (ANCEF) 2,000 mg in sterile water 20 mL IV syringe (has no administration in time range)   ketorolac (TORADOL) injection 15 mg (15 mg IntraVENous Given 1/21/23 0228)   acetaminophen (TYLENOL) tablet 1,000 mg (1,000 mg Oral Given 1/21/23 0549)             Medical Decision Making/Differential Diagnosis:    CC/HPI Summary, Social Determinants of health, Records Reviewed, DDx, testing done/not done, ED Course, Reassessment, disposition considerations/shared decision making with patient, consults, disposition:             Medical Decision Making  61-year-old female, history of Down syndrome, presenting as transfer from [place] [address] ED for a left spiral tib-fib fracture. I reviewed the x-rays, minimally displaced distal spiral tibial fracture. She is in a splint upon arrival, neurovascularly intact.   Awaiting mother's arrival to gather more information. Case was discussed with orthopedic surgery prior to transfer, they will be consulted to evaluate patient at bedside. She is resting comfortably, otherwise well-appearing and hemodynamically stable. Orthopedic surgery was consulted, they will admit to their service for operative repair. She was placed in a longer leg splint, applied by orthopedics. See their separate note as it was performed without ED assistance. Patient remained hemodynamically stable and rest comfortably during remainder of ED course. CT of the left tib-fib confirms the oblique fracture of the distal tibia. Amount and/or Complexity of Data Reviewed  Independent Historian: parent  External Data Reviewed: radiology. Radiology: ordered. Decision-making details documented in ED Course. Risk  OTC drugs. Prescription drug management. Decision regarding hospitalization. CONSULTS: (Who and What was discussed)  IP CONSULT TO ORTHOPEDIC SURGERY  IP CONSULT TO INTERNAL MEDICINE  Admitted to orthopedic surgery      I am the Primary Clinician of Record. FINAL IMPRESSION      1. Closed fracture of left tibia and fibula, initial encounter          DISPOSITION/PLAN     DISPOSITION Admitted 01/21/2023 06:31:52 AM      PATIENT REFERRED TO:  No follow-up provider specified.     DISCHARGE MEDICATIONS:  New Prescriptions    No medications on file       DISCONTINUED MEDICATIONS:  Discontinued Medications    No medications on file              (Please note that portions of this note were completed with a voice recognition program.  Efforts were made to edit the dictations but occasionally words are mis-transcribed.)    Antione Headley DO (electronically signed)            Antione Headley DO  01/21/23 0719

## 2023-01-21 NOTE — CONSULTS
Department of Orthopedic Trauma Surgery  Resident consult note      CHIEF COMPLAINT:   Chief Complaint   Patient presents with    Ankle Pain     Pt a transfer from [address] [place] [address] for a left ankle fx from fall on Thursday tripped over a dog bone. HISTORY OF PRESENT ILLNESS:                Patient is a 30 y.o. female with a history of Down syndrome who presents with left leg pain after she tripped over her dog bone yesterday. She presented to [facility] was subsequently transferred to [facility]. She subsequently fell and had immediate pain to her left ankle. She is unable to ambulate after the fall. Denies any other injuries or pain. Denies numbness/tingling/paresthesias. Denies any other orthopedic complaints at this time. Past Medical History:        Diagnosis Date    Chest discomfort     Down syndrome     Heartburn 06/01/2016    History of open heart surgery     when 11 month old     Past Surgical History:        Procedure Laterality Date    [facility]    5 months old     CHOLECYSTECTOMY      KNEE SURGERY      TONSILLECTOMY       Current Medications:   No current facility-administered medications for this encounter. Allergies:  Patient has no known allergies. Social History:   TOBACCO:   reports that she has never smoked. She has never used smokeless tobacco.  ETOH:   reports no history of alcohol use. DRUGS:   reports no history of drug use.   ACTIVITIES OF DAILY LIVING:    OCCUPATION:    Family History:       Problem Relation Age of Onset    Other Father         Suicide       REVIEW OF SYSTEMS:   Skin: no acute changes  Eyes: no acute changes  Ears/Nose/Throat: no acute changes  Respiratory: No increased work of breathing, no coughing  Cardiovascular: Brisk capillary refill bilaterally, well perfused extremities  Gastrointestinal: no acute changes  Neurologic: no acute changes  MUSCULOSKELETAL:  positive for  pain      PHYSICAL EXAM:    VITALS:  BP (!) 112/53   Pulse 86   Temp 98.3 °F (36.8 °C) (Oral)   Resp 14   Ht 5' 2\" (1.575 m)   Wt 162 lb (73.5 kg)   SpO2 96%   BMI 29.63 kg/m²   Constitutional: Oriented to person, place, and time; Answer questions appropriately  HENT: Head: Atraumatic. Eyes: EOMI  Neck: Trachea midline  Cardiovascular: Brisk capillary refill to all extremities, extremities well perfused  Pulmonary/Chest: No increased work of breathing, no cough  Abdominal: Non-distended  Neurologic:  Awake, alert and oriented in three planes. No gross deficits   MUSCULOSKELETAL:  Left lower Extremity:  Skin is intact circumferentially  Mild edema circumferentially about the ankle  +TTP about midshaft tibia and ankle   compartments soft and compressible  Palpable dorsalis pedis and posterior tibialis pulse, brisk cap refill to toes, foot warm and perfused  Sensation intact to light touch in sural/deep peroneal/superficial peroneal/saphenous/posterior tibial nerve distributions to foot/ankle  Demonstrates active ankle plantar/dorsiflexion/great toe extension    Secondary Exam:   bilateralUE: No obvious signs of trauma. -TTP to fingers, hand, wrist, forearm, elbow, humerus, shoulder or clavicle. -- Patient able to flex/extend fingers, wrist, elbow and shoulder with active and passive ROM without pain, +2/4 Radial pulse, compartments soft and compressible. rightLE: No obvious signs of trauma. -TTP to foot, ankle, leg, knee, thigh, hip.-- Patient able to flex/extend toes, ankle, knee and hip with active and passive ROM without pain,+2/4 DP & PT pulses, compartments soft and compressible.     Pelvis: -TTP, -Log roll, -Heel strike         DATA:    CBC:   Lab Results   Component Value Date/Time    WBC 5.8 04/08/2013 07:50 AM    RBC 4.31 04/08/2013 07:50 AM    HGB 14.1 04/08/2013 07:50 AM    HCT 42.5 04/08/2013 07:50 AM    MCV 98.5 04/08/2013 07:50 AM    MCH 32.8 04/08/2013 07:50 AM    MCHC 33.3 04/08/2013 07:50 AM    RDW 13.9 04/08/2013 07:50 AM     04/08/2013 07:50 AM    MPV 7.8 04/08/2013 07:50 AM     PT/INR:  No results found for: PROTIME, INR    Radiology Review:  X-ray left tibia-fibula and ankle demonstrate a spiral fracture about the distal third of the tibia that is minimally displaced. There is extension into the mortise joint. There is also a noticeable fifth metacarpal base fracture that is nondisplaced. No other fractures or dislocations noted.     IMPRESSION:  Closed, left distal third tibia shaft fracture    PLAN:  A well-padded 3 sided long posterior splint was applied to the left lower extremity  Nonweightbearing left lower extremity  Admission postoperatively  Plan for operative intervention this morning with ORIF  Multimodal pain control  Ancef on-call to the OR  Treatment consent  N.p.o. now  Discuss with attending

## 2023-01-21 NOTE — DISCHARGE INSTRUCTIONS
[facility]) Department of Orthopedic Surgery  [address] MD Dr. Glory Peed, MD Lucious Mate, PA-C Chevis Locks PA-C Conny Stack PA-C      Orthopaedics Discharge Instructions   Weight bearing Status - Non-weight bearing - on left lower Extremity  Pain medication Per Prescriptions  Contact Office for Medication Refill- [phone]  Office can refill pain med every 7 days  If patient discharging to facility then pain control will be continued per facility physician  Ice to operative/injured site for 15-30 minutes of each hour for next 5 days    Recommend that you continue to ice the area 2-3 times per day after this   Elevate operative/injured limb on 2 pillows at home  Goal is to have limb above the heart if able  Continue DVT Prophylaxis (blood clot prevention) as Prescribed: aspirin 81 mg twice daily   If your splint/cast becomes too tight, too loose, wet or damaged please contact our office right away we will need to change out the splint/cast.     Follow Up in Office in 2 weeks. Your first post op appointment is often with one of our PAs. Call the office at [phone] or directions or with any questions. Watch for these significant complications. Call physician if they or any other problems occur:  Fever over 101°, redness, swelling or warmth at the operative site  Unrelieved nausea    Foul smelling or cloudy drainage at the operative site   Unrelieved pain    Blood soaked dressing. (Some oozing may be normal)     Numb, pale, blue, cold or tingling extremity    Future Appointments   Date Time Provider Francisco Randhawa   2/8/2023 10:45 AM Elizabeth Hernandez MD  [facility]         It is the Department of Orthopaedic Trauma's standard of practice that providers will de-escalate(wean) all patients from narcotic(opioid) medications during the post-operative period.    We provide multimodal pain control but opioid medications are tapered in all of our patients. If patient requires referral to pain management for prolonged taper off of opioid pain medication we will facilitate this process.

## 2023-01-21 NOTE — ED NOTES
N2N report given to Dirk Meckel at [facility] ER     [employer], RN  01/20/23 [id number]

## 2023-01-21 NOTE — PROGRESS NOTES
[address], [place], [facility]    Dx: trauma/fall, left ankle/foot fracture    Transfer ED to [facility] ED    Dr. Diego Smith ([facility]) spoke with Dr. Bruna Carbone, accepting physician.     [facility] ETA [phone]

## 2023-01-21 NOTE — OP NOTE
Operative Note      Patient: Fabrice Pereira  YOB: 1985  MRN: [id number]    Date of Procedure: 1/21/2023    Pre-Op Diagnosis: Closed left tibial pilon fracture    Post-Op Diagnosis: Same         Procedure:  Open reduction internal fixation left tibial pilon fracture, tibia only with plate and screws          Surgeon(s):  Maurilio Huang MD    Assistant:   Resident: Patrice Mo DO; Mehrdad Staples DO    Anesthesia: General    Estimated Blood Loss (mL): Minimal    Complications: None    Specimens:   * No specimens in log *    Implants:  Implant Name Type Inv. Item Serial No.  Lot No. LRB No. Used Action   SCREW BNE L26MM DIA3.5MM RAFA S STL ST NONCANNULATED ARIANNA - [id number]  SCREW BNE L26MM DIA3.5MM RAFA S STL ST NONCANNULATED ARIANNA  [employer]  Left 1 Implanted   SCREW BNE L28MM DIA3.5MM RAFA S STL ST NONCANNULATED ARIANNA - [id number]  SCREW BNE L28MM DIA3.5MM RAFA S STL ST NONCANNULATED ARIANNA  [employer]-WD  Left 1 Implanted   PLATE METAPHYSEAL LCP SS [id number] - [id number]  PLATE METAPHYSEAL LCP SS [id number]  DEPU[employer]-  Left 1 Implanted   SCREW BNE L22MM DIA3.5MM RAFA S STL ST NONCANNULATED ARIANNA - [id number]  SCREW BNE L22MM DIA3.5MM RAFA S STL ST NONCANNULATED ARIANNA  DEPU[employer]-WD  Left 2 Implanted   SCREW BNE L24MM DIA3.5MM RAFA S STL ST NONCANNULATED ARIANNA - [id number]  SCREW BNE L24MM DIA3.5MM RFAA S STL ST NONCANNULATED ARIANNA  DEPU[employer]-WD  Left 1 Implanted   SCREW BNE L14MM DIA3.5MM RAFA S STL ST ARIANNA FULL THRD - [id number]  SCREW BNE L14MM DIA3.5MM RAFA S STL ST ARIANNA FULL THRD  DEPUY [employer]-WD  Left 1 Implanted         Drains: * No LDAs found *    Findings: Used a 12 hole [employer] metaphyseal plate with 2 independent lag screws    Detailed Description of Procedure:   Patient was brought to the operating room in a supine position on a hospital bed.   Patient was transferred to the operating room table by multiple individuals in a safe fashion with anesthesia in control of the patient's C-spine and airway. Once on the operating table, all points of pressure were identified and well-padded. A tourniquet was applied to the patient's left upper thigh. The patient's left lower extremity was sterilely prepped and draped in the standard orthopedic fashion. A timeout was performed indicating the appropriate identification of the patient, the procedure to be performed, and the side to be performed upon. This was agreed upon by all individuals in the room. The left leg was placed on bone foam.  Fluoroscopy was brought in position. A tourniquet was elevated after Esmarch application to 397 mmHg. At this point time fluoroscopy was used to rajat out our fracture. Small stab incisions were made and a percutaneous clamp was used to reduce the fracture near anatomically. At this point time a percutaneous lag screw was placed near the top of the fracture to key in the spike. This was a 3.5 millimeter screw placed by technique. We then placed an additional screw across the joint to address the intra-articular component. Once this reduced the fracture down very nicely had 2 good lag screws with good bite we then made a medial malleolus approach to slide the plate. Dissection was carried to identify and protect the saphenous vein. A 12 hole plate was then slid in position submuscularly. Once in position at the proximal portion of the plate we made another small incision on the medial side the tibia. We provided a bicortical screw in this position. Once we had the plate contoured down to the bone with the plate press and with the for screw we then placed multiple bicortical screws proximally a lag screw through the plate in addition distally a locking screw also was placed distally. After adequate fixation multiple views showed near-anatomic reduction with hardware in good position. The patient's wounds were then pepe irrigated sterile saline. They are closed with 2-0 Monocryl in the subcutaneous tissue and 3-0 nylon in the skin. Patient had bacitracin Xeroform and a well-padded splint placed in the position. Patient was then taken the PACU in stable condition. Postoperative plan:  1. Strict nonweightbearing left lower extremity    2. Aspirin orally for DVT prophylaxis    3. Follow-up in the office in 2 weeks will most likely place in a cast versus a boot.       Electronically signed by Flavio Singleton MD on 1/21/2023 at 11:33 AM

## 2023-01-21 NOTE — H&P
Hospitalist History & Physical      PCP: Ed Barkley III,     Date of Service: Pt seen/examined on 1/21/2023 and is admitted to Inpatient with expected LOS greater than two midnights due to medical therapy.        Chief Complaint:  had concerns including Ankle Pain (Pt a transfer from [place] for a left ankle fx from fall on Thursday tripped over a dog bone. ).    History Of Present Illness:    Ms. Harper Fajardo, a 37 y.o. year old female  who  has a past medical history of Chest discomfort, Down syndrome, Heartburn, and History of open heart surgery.  The patient was a transfer from   [facility]. The patient had suffered left leg pain after she tripped over her dog bone. The patient was noted to X-rays which were concerning for soft tissue swelling at the ankle; oblique to the spiral distal tibial fracture with intra-articular extension. The patient underwent a open reduction internal fixation left tibial pilon fracture, tibia only with plate and screws on 1/21/23. Medicine was consulted for medical management.     ROS: AS per the HPI. Patient's mom at the bedside. No c/o chest pain, SOB, Abd pain. + left leg pain. No constipation or diarrhea.     Past Medical History:   Diagnosis Date    Chest discomfort     Down syndrome     Heartburn 06/01/2016    History of open heart surgery     when 5 month old       Past Surgical History:   Procedure Laterality Date    CARDIAC SURGERY      CARDIAC VALVE SURGERY  1987    5 months old     CHOLECYSTECTOMY      KNEE SURGERY      TONSILLECTOMY         Prior to Admission medications    Medication Sig Start Date End Date Taking? Authorizing Provider   aspirin EC 81 MG EC tablet Take 1 tablet by mouth 2 times daily for 28 days 1/21/23 2/18/23 Yes Parish Jaime, DO   oxyCODONE-acetaminophen (PERCOCET) 5-325 MG per tablet Take 1 tablet by mouth every 6 hours as needed for Pain for up to 7 days. Intended supply: 7 days. Take lowest dose possible to manage  pain Max Daily Amount: 4 tablets 1/21/23 1/28/23 Yes Héctor Mata, DO   DEXILANT 60 MG CPDR capsule 60 mg daily 6/10/16   Historical Provider, MD         Allergies:  Patient has no known allergies. Social History:    TOBACCO:   reports that she has never smoked. She has never used smokeless tobacco.  ETOH:   reports no history of alcohol use. Family History:    Reviewed in detail and negative for DM, CAD, Cancer, CVA. Positive as follows\"      Problem Relation Age of Onset    Other Father         Suicide       REVIEW OF SYSTEMS:   Pertinent positives as noted in the HPI. All other systems reviewed and negative. PHYSICAL EXAM:  /69   Pulse 89   Temp 97.3 °F (36.3 °C) (Temporal)   Resp 14   Ht 5' 2\" (1.575 m)   Wt 162 lb (73.5 kg)   SpO2 93%   BMI 29.63 kg/m²   General appearance: No apparent distress, appears stated age and cooperative. HEENT: Normal cephalic, atraumatic without obvious deformity. Pupils equal, round, and reactive to light. Extra ocular muscles intact. Conjunctivae/corneas clear. Neck: Supple, with full range of motion. No jugular venous distention. Trachea midline. Respiratory:  CTA (b/l)   Cardiovascular:  S1, S2 are present   Abdomen:  Soft +SB NT/ND  Musculoskeletal: Surgical dressings on the left leg. Skin: Normal skin color. No rashes or lesions. Psychiatric: Alert     Reviewed EKG and CXR personally      CBC:   Recent Labs     01/21/23  0554   WBC 5.9   RBC 4.04   HGB 12.0   HCT 36.0   MCV 89.1   RDW 14.6        BMP: No results for input(s): NA, K, CL, CO2, BUN, CREATININE, CA, MG, PHOS in the last 72 hours. LFT:  No results for input(s): PROT, ALB, ALKPHOS, ALT, AST, BILITOT, AMYLASE, LIPASE in the last 72 hours. CE:  No results for input(s): Karen Shanks in the last 72 hours. PT/INR:   Recent Labs     01/21/23  0554   INR 1.1   APTT 32.1     BNP: No results for input(s): BNP in the last 72 hours.   ESR: No results found for: SEDRATE  CRP: No results found for: CRP  D Dimer: No results found for: DDIMER   Folate and B12: No results found for: [id number], No results found for: FOLATE  Lactic Acid: No results found for: LACTA  Thyroid Studies: No results found for: TSH, Y9LEVOQ, S3FIBCJ, THYROIDAB    Oupatient labs:  Lab Results   Component Value Date    INR 1.1 01/21/2023       Urinalysis:  No results found for: NITRU, WBCUA, BACTERIA, RBCUA, BLOODU, SPECGRAV, GLUCOSEU    Imaging:  XR TIBIA FIBULA LEFT (2 VIEWS)    Result Date: 1/21/2023  EXAMINATION: 2 XRAY VIEWS OF THE LEFT TIBIA AND FIBULA; THREE XRAY VIEWS OF THE LEFT ANKLE 1/21/2023 1:18 pm COMPARISON: 01/21/2023 HISTORY: ORDERING SYSTEM PROVIDED HISTORY: pacu TECHNOLOGIST PROVIDED HISTORY: Reason for exam:->pacu What reading provider will be dictating this exam?->CRC FINDINGS: Two views of left tibia and fibula and three views of left ankle reveal cortical plate and screws aligning a distal tibia fracture. Satisfactory alignment identified of the bony fragments. Ankle mortise is intact. No hardware complication. Fixation of the distal tibia fracture with satisfactory alignment of the fracture fragments. No hardware complication identified. The ankle mortise is intact. XR TIBIA FIBULA LEFT (2 VIEWS)    Result Date: 1/20/2023  EXAMINATION: 2 XRAY VIEWS OF THE LEFT TIBIA AND FIBULA 1/20/2023 4:49 pm COMPARISON: None. HISTORY: ORDERING SYSTEM PROVIDED HISTORY: fracture distal tibia, need to assess for more proximal fracture TECHNOLOGIST PROVIDED HISTORY: Reason for exam:->fracture distal tibia, need to assess for more proximal fracture FINDINGS: There is an oblique to spiral minimally comminuted fracture of the distal tibia extending to the articular surface. Consider pilon fracture. Proximal tibia and fibula appear intact. Mild degenerative changes at the knee joint with lateral subluxation of the patella. Moderate soft tissue swelling at the ankle.      Soft tissue swelling at the ankle, oblique to spiral distal tibial fracture with intra-articular extension. XR ANKLE LEFT (MIN 3 VIEWS)    Result Date: 1/21/2023  EXAMINATION: 2 XRAY VIEWS OF THE LEFT TIBIA AND FIBULA; THREE XRAY VIEWS OF THE LEFT ANKLE 1/21/2023 1:18 pm COMPARISON: 01/21/2023 HISTORY: ORDERING SYSTEM PROVIDED HISTORY: pacu TECHNOLOGIST PROVIDED HISTORY: Reason for exam:->pacu What reading provider will be dictating this exam?->CRC FINDINGS: Two views of left tibia and fibula and three views of left ankle reveal cortical plate and screws aligning a distal tibia fracture. Satisfactory alignment identified of the bony fragments. Ankle mortise is intact. No hardware complication. Fixation of the distal tibia fracture with satisfactory alignment of the fracture fragments. No hardware complication identified. The ankle mortise is intact. XR ANKLE LEFT (MIN 3 VIEWS)    Result Date: 1/20/2023  EXAMINATION: THREE XRAY VIEWS OF THE LEFT ANKLE 1/20/2023 4:11 pm COMPARISON: None. HISTORY: ORDERING SYSTEM PROVIDED HISTORY: injury TECHNOLOGIST PROVIDED HISTORY: Reason for exam:->injury FINDINGS: There is oblique fracture through the visualized distal tibia. There is disruption of ankle mortise. Diffuse soft tissue swelling noted. Oblique fracture through the distal fibula, suspicious for intra-articular involvement. XR FOOT LEFT (MIN 3 VIEWS)    Result Date: 1/20/2023  EXAMINATION: THREE XRAY VIEWS OF THE LEFT FOOT 1/20/2023 4:11 pm COMPARISON: None. HISTORY: ORDERING SYSTEM PROVIDED HISTORY: injury TECHNOLOGIST PROVIDED HISTORY: Reason for exam:->injury FINDINGS: Severe hallux valgus deformity. No acute fractures. Diffuse soft tissue swelling. No radiopaque foreign bodies. No acute bony abnormalities. Partially visualized distal tibial fracture.      CT TIBIA FIBULA LEFT WO CONTRAST    Result Date: 1/21/2023  EXAMINATION: CT OF THE LEFT TIBIA AND FIBULA WITHOUT CONTRAST 1/21/2023 3:31 am TECHNIQUE: CT of the left tibia and fibula was performed without the administration of intravenous contrast.  Multiplanar reformatted images are provided for review. Automated exposure control, iterative reconstruction, and/or weight based adjustment of the mA/kV was utilized to reduce the radiation dose to as low as reasonably achievable. COMPARISON: None. HISTORY ORDERING SYSTEM PROVIDED HISTORY: Fracture extension TECHNOLOGIST PROVIDED HISTORY: Reason for exam:->Fracture extension Decision Support Exception - unselect if not a suspected or confirmed emergency medical condition->Emergency Medical Condition (MA) What reading provider will be dictating this exam?->CRC FINDINGS: Bones: Oblique fracture distal tibial metadiaphysis extends 8.5 cm above the level of the ankle mortise.  Mildly comminuted fracture lines intersect the tibial distal articular surface and medial malleolus without significant step-off.  No significant syndesmosis widening. Soft Tissue: No significant soft tissue edema or fluid collections.  Splint in position. Joint: No significant degenerative changes. No osseous erosions.     Oblique fracture distal tibial metadiaphysis extends 8.5 cm above the level of the ankle mortise. RECOMMENDATIONS: Careful clinical correlation and follow up recommended.     CT ANKLE LEFT WO CONTRAST    Result Date: 1/21/2023  EXAMINATION: CT OF THE LEFT ANKLE WITHOUT CONTRAST 1/21/2023 3:31 am TECHNIQUE: CT of the left ankle was performed without the administration of intravenous contrast.  Multiplanar reformatted images are provided for review. Automated exposure control, iterative reconstruction, and/or weight based adjustment of the mA/kV was utilized to reduce the radiation dose to as low as reasonably achievable. COMPARISON: January 20, 2023 radiographs HISTORY ORDERING SYSTEM PROVIDED HISTORY: Fracture TECHNOLOGIST PROVIDED HISTORY: Reason for exam:->Fracture Decision Support Exception - unselect if not a suspected or confirmed  emergency medical condition->Emergency Medical Condition (MA) What reading provider will be dictating this exam?->CRC FINDINGS: Bones: Oblique fracture of the distal tibia extending to the level of the ankle mortise and intersecting the tibial medial malleolus. No articular step-off. Proximal fracture line extends 8.5 cm above the level of the ankle mortise. The talus, calcaneus, midfoot and forefoot bones are intact. . Soft Tissue: No significant soft tissue edema or fluid collections. Splint. Joint: No significant degenerative changes. No osseous erosions. 1. Distal tibial fracture with minimal displacement. 2. Ankle mortise otherwise intact. RECOMMENDATIONS: Careful clinical correlation and follow up recommended. ASSESSMENT:     30y.o. year old female  who  has a past medical history of Chest discomfort, Down syndrome, Heartburn, and History of open heart surgery. The patient was a transfer from   [facility]. The patient had suffered left leg pain after she tripped over her dog bone. The patient was noted to X-rays which were concerning for soft tissue swelling at the ankle; oblique to the spiral distal tibial fracture with intra-articular extension. The patient underwent a open reduction internal fixation left tibial pilon fracture, tibia only with plate and screws on 8/61/68. PLAN:    1.) Closed left tibial pilon fracture s/p Open reduction internal fixation left tibial pilon fracture, tibia only with plate and screws. As per Ortho.   2.) GERD: Patient's Dexilant (home med) was substituted for Protonix. 3.)Down's syndrome   4.) H/o Open heart surgery; as per mom, no meds on at home. 5.) DVT proph: As per orthopedics. Will ask pharmacy to review patient's home med rec. - only dexilant was noted- pharmacy confirmed. - Labs in the am.           Diet: ADULT DIET;  Regular  Code Status: Full Code  Surrogate decision maker confirmed with patient:   Extended Emergency Contact Information  Primary Emergency Contact: Nadya Fajardo  Address: [address] [place] [address] , [address] [place], [place] [address] [address] Phone: [phone]  Relation: Parent  Secondary Emergency Contact: Non,None Per Mother  Relation: Other    DVT Prophylaxis: []Lovenox []Heparin []PCD [] [address]  []Encouraged ambulation  Disposition: []Med/Surg [] Intermediate [] ICU/CCU  Admit status: [] Observation [] Inpatient     +++++++++++++++++++++++++++++++++++++++++++++++++  Helyn Right, MD  +++++++++++++++++++++++++++++++++++++++++++++++++  NOTE: This report was transcribed using voice recognition software. Every effort was made to ensure accuracy; however, inadvertent computerized transcription errors may be present.

## 2023-01-21 NOTE — ANESTHESIA PRE PROCEDURE
Department of Anesthesiology  Preprocedure Note       Name:  Malcom Craig   Age:  30 y.o.  :  1985                                          MRN:  [id number]         Date:  2023      Surgeon: Clayton Pascal):  Heather Rahman MD    Procedure: Procedure(s):  TIBIA OPEN REDUCTION INTERNAL FIXATION LEFT    Medications prior to admission:   Prior to Admission medications    Medication Sig Start Date End Date Taking? Authorizing Provider   DEXILANT 60 MG CPDR capsule 60 mg daily 6/10/16   Historical Provider, MD       Current medications:    Current Facility-Administered Medications   Medication Dose Route Frequency Provider Last Rate Last Admin    ceFAZolin (ANCEF) 2,000 mg in sterile water 20 mL IV syringe  2,000 mg IntraVENous See Admin Instructions Sabine Nice DO         Current Outpatient Medications   Medication Sig Dispense Refill    DEXILANT 60 MG CPDR capsule 60 mg daily  1       Allergies:  No Known Allergies    Problem List:    Patient Active Problem List   Diagnosis Code    Tibia/fibula fracture, shaft, left, closed, initial encounter S82.202A, S82.402A       Past Medical History:        Diagnosis Date    Chest discomfort     Down syndrome     Heartburn 2016    History of open heart surgery     when 11 month old       Past Surgical History:        Procedure Laterality Date    [place] 22    5 months old     CHOLECYSTECTOMY      KNEE SURGERY      TONSILLECTOMY         Social History:    Social History     Tobacco Use    Smoking status: Never    Smokeless tobacco: Never   Substance Use Topics    Alcohol use:  No                                Counseling given: Not Answered      Vital Signs (Current):   Vitals:    23 0012 23 0228 23 0610 23 0725   BP: 127/84 (!) 112/53 109/70 120/76   Pulse: 92 86 92 83   Resp: 16 14 14 14   Temp: 97.2 °F (36.2 °C) 98.3 °F (36.8 °C)     TempSrc: Oral Oral     SpO2: 98% 96% 100% 96%   Weight: 162 lb (73.5 kg)      Height: 5' 2\" (1.575 m)                                                 BP Readings from Last 3 Encounters:   01/21/23 120/76   01/20/23 121/69   06/15/16 104/66       NPO Status:                                                                                 BMI:   Wt Readings from Last 3 Encounters:   01/21/23 162 lb (73.5 kg)   01/20/23 162 lb (73.5 kg)   06/15/16 144 lb (65.3 kg)     Body mass index is 29.63 kg/m². CBC:   Lab Results   Component Value Date/Time    WBC 5.9 01/21/2023 05:54 AM    RBC 4.04 01/21/2023 05:54 AM    HGB 12.0 01/21/2023 05:54 AM    HCT 36.0 01/21/2023 05:54 AM    MCV 89.1 01/21/2023 05:54 AM    RDW 14.6 01/21/2023 05:54 AM     01/21/2023 05:54 AM       CMP:   Lab Results   Component Value Date/Time     04/08/2013 07:50 AM    K 4.2 04/08/2013 07:50 AM     04/08/2013 07:50 AM    CO2 33 04/08/2013 07:50 AM    BUN 16 04/08/2013 07:50 AM    CREATININE 0.8 04/08/2013 07:50 AM    LABGLOM >60 04/08/2013 08:47 AM    GLUCOSE 98 04/08/2013 07:50 AM    PROT 7.3 04/08/2013 07:50 AM    CALCIUM 9.5 04/08/2013 07:50 AM    BILITOT 0.3 04/08/2013 07:50 AM    ALKPHOS 51 04/08/2013 07:50 AM    AST 29 04/08/2013 07:50 AM    ALT 48 04/08/2013 07:50 AM       POC Tests: No results for input(s): POCGLU, POCNA, POCK, POCCL, POCBUN, POCHEMO, POCHCT in the last 72 hours.     Coags:   Lab Results   Component Value Date/Time    PROTIME 12.4 01/21/2023 05:54 AM    INR 1.1 01/21/2023 05:54 AM    APTT 32.1 01/21/2023 05:54 AM       HCG (If Applicable): No results found for: PREGTESTUR, PREGSERUM, HCG, HCGQUANT     ABGs: No results found for: PHART, PO2ART, OYF1TXC, DQK7LWM, BEART, N6ETANJE     Type & Screen (If Applicable):  No results found for: LABABO, LABRH    Drug/Infectious Status (If Applicable):  No results found for: HIV, HEPCAB    COVID-19 Screening (If Applicable): No results found for: COVID19        Anesthesia Evaluation    Airway: Mallampati: IV  TM distance: <3 FB   Neck ROM: full  Comment: Arched palate  Mouth opening: > = 3 FB   Dental: normal exam         Pulmonary: breath sounds clear to auscultation                             Cardiovascular:            Rhythm: regular                   ROS comment: History of heart surgery as child for 2 cardiac defect  Closures, per mom statement. No cardiologu follow-up for years because it was not necessary anymore. Neuro/Psych:                ROS comment: Down syndrome GI/Hepatic/Renal:             Endo/Other:                      ROS comment: Tibia fracture Abdominal:             Vascular: Other Findings:           Anesthesia Plan      general     ASA 3       Induction: intravenous. MIPS: Postoperative opioids intended and Prophylactic antiemetics administered. Anesthetic plan and risks discussed with patient and mother. Use of blood products discussed with patient and mother whom. Plan discussed with CRNA.                     Mine Eldridge MD   1/21/2023

## 2023-01-21 NOTE — H&P
Department of Orthopedic Trauma Surgery  Resident H&P note      CHIEF COMPLAINT:   Chief Complaint   Patient presents with    Ankle Pain     Pt a transfer from [address] [place] [address] for a left ankle fx from fall on Thursday tripped over a dog bone. HISTORY OF PRESENT ILLNESS:                Patient is a 30 y.o. female with a history of Down syndrome who presents with left leg pain after she tripped over her dog bone yesterday. She presented to [facility] was subsequently transferred to [facility]. She subsequently fell and had immediate pain to her left ankle. She is unable to ambulate after the fall. Denies any other injuries or pain. Denies numbness/tingling/paresthesias. Denies any other orthopedic complaints at this time. Past Medical History:        Diagnosis Date    Chest discomfort     Down syndrome     Heartburn 06/01/2016    History of open heart surgery     when 11 month old     Past Surgical History:        Procedure Laterality Date    [facility]    5 months old     CHOLECYSTECTOMY      KNEE SURGERY      TONSILLECTOMY       Current Medications:   Current Facility-Administered Medications: acetaminophen (TYLENOL) tablet 1,000 mg, 1,000 mg, Oral, NOW  Allergies:  Patient has no known allergies. Social History:   TOBACCO:   reports that she has never smoked. She has never used smokeless tobacco.  ETOH:   reports no history of alcohol use. DRUGS:   reports no history of drug use.   ACTIVITIES OF DAILY LIVING:    OCCUPATION:    Family History:       Problem Relation Age of Onset    Other Father         Suicide       REVIEW OF SYSTEMS:   Skin: no acute changes  Eyes: no acute changes  Ears/Nose/Throat: no acute changes  Respiratory: No increased work of breathing, no coughing  Cardiovascular: Brisk capillary refill bilaterally, well perfused extremities  Gastrointestinal: no acute changes  Neurologic: no acute changes  MUSCULOSKELETAL:  positive for  pain      PHYSICAL EXAM:    VITALS:  BP (!) 112/53   Pulse 86   Temp 98.3 °F (36.8 °C) (Oral)   Resp 14   Ht 5' 2\" (1.575 m)   Wt 162 lb (73.5 kg)   SpO2 96%   BMI 29.63 kg/m²   Constitutional: Oriented to person, place, and time; Answer questions appropriately  HENT: Head: Atraumatic. Eyes: EOMI  Neck: Trachea midline  Cardiovascular: Brisk capillary refill to all extremities, extremities well perfused  Pulmonary/Chest: No increased work of breathing, no cough  Abdominal: Non-distended  Neurologic:  Awake, alert and oriented in three planes. No gross deficits   MUSCULOSKELETAL:  Left lower Extremity:  Skin is intact circumferentially  Mild edema circumferentially about the ankle  +TTP about midshaft tibia and ankle   compartments soft and compressible  Palpable dorsalis pedis and posterior tibialis pulse, brisk cap refill to toes, foot warm and perfused  Sensation intact to light touch in sural/deep peroneal/superficial peroneal/saphenous/posterior tibial nerve distributions to foot/ankle  Demonstrates active ankle plantar/dorsiflexion/great toe extension    Secondary Exam:   bilateralUE: No obvious signs of trauma. -TTP to fingers, hand, wrist, forearm, elbow, humerus, shoulder or clavicle. -- Patient able to flex/extend fingers, wrist, elbow and shoulder with active and passive ROM without pain, +2/4 Radial pulse, compartments soft and compressible. rightLE: No obvious signs of trauma. -TTP to foot, ankle, leg, knee, thigh, hip.-- Patient able to flex/extend toes, ankle, knee and hip with active and passive ROM without pain,+2/4 DP & PT pulses, compartments soft and compressible.     Pelvis: -TTP, -Log roll, -Heel strike         DATA:    CBC:   Lab Results   Component Value Date/Time    WBC 5.8 04/08/2013 07:50 AM    RBC 4.31 04/08/2013 07:50 AM    HGB 14.1 04/08/2013 07:50 AM    HCT 42.5 04/08/2013 07:50 AM    MCV 98.5 04/08/2013 07:50 AM    MCH 32.8 04/08/2013 07:50 AM    MCHC 33.3 04/08/2013 07:50 AM    RDW 13.9 04/08/2013 07:50 AM     04/08/2013 07:50 AM    MPV 7.8 04/08/2013 07:50 AM     PT/INR:  No results found for: PROTIME, INR    Radiology Review:  X-ray left tibia-fibula and ankle demonstrate a spiral fracture about the distal third of the tibia that is minimally displaced. There is extension into the mortise joint. There is also a noticeable fifth metacarpal base fracture that is nondisplaced. No other fractures or dislocations noted. IMPRESSION:  Closed, left distal third tibia shaft fracture    PLAN:  A well-padded 3 sided long posterior splint was applied to the left lower extremity  Nonweightbearing left lower extremity  Admission postoperatively  Plan for operative intervention this morning with ORIF  Multimodal pain control, IV and p.o. Ancef on-call to the OR  Treatment consent  N.p.o. now  Discuss with attending      I have seen and evaluated the patient and agree with the above assessment on today's visit. I have performed the key components of the history and physical examination and concur completely with the findings and plans as documented. Agree with ROS, examination, FMH, PMH, PSH, SocHx, and allergies as above. Is a very pleasant 29-year-old female with Down syndrome who tripped over a dog bone yesterday suffering a left distal tibial pilon fracture. There is a spiral fracture starting the distal diaphysis spiraling down to the ankle joint. She was transferred from [facility].  Her compartments are soft compressible. Decision was made to potentially try intramedullary nailing although on further evaluation of her CT scan it was obvious that her intramedullary canal was only about 2.3 to 2.7 mm. This would make it an adequate size for intramedullary nailing. The decision was made to go forward with more of a limited exposure plating. I talked to the patient and her mother in detail.   Talked about the risk of surgery including death and anesthesia, infection, wound healing issues, potential for nonunion, need for hardware removal, neurovascular damage, and the fact that she would need protected weightbearing for potentially up to 3 months for this to heal.  They understood this and decided proceed with surgery after asking appropriate questions. Past Medical History:   Diagnosis Date    Chest discomfort     Down syndrome     Heartburn 06/01/2016    History of open heart surgery     when 11 month old     Past Surgical History:   Procedure Laterality Date    [address]    5 months old     CHOLECYSTECTOMY      KNEE SURGERY      TONSILLECTOMY       Family History   Problem Relation Age of Onset    Other Father         Suicide     Social History     Tobacco Use    Smoking status: Never    Smokeless tobacco: Never   Substance Use Topics    Alcohol use: No    Drug use: No     No Known Allergies        Physical Examination:   General appearance: alert, well appearing, and in no distress,  normal appearing weight. No visible signs of trauma   Mental status: Very pleasant answers questions appropriately. Does have difficulty answering more detailed questions. Abdomen: soft, nondistended  Resp:   resp easy and unlabored, no audible wheezes note, normal symmetrical expansion of both hemithoraces  Cardiac: distal pulses palpable, skin and extremities well perfused  Neurological: alert, oriented X3, normal speech, no focal findings or movement disorder noted, motor and sensory grossly normal bilaterally, normal muscle tone, no tremors  HEENT: normochephalic atraumatic, external ears and eyes normal, sclera normal, neck supple, no nasal discharge.    Extremities:   peripheral pulses normal, no edema, redness or tenderness in the calves   Skin: normal coloration, no rashes or open wounds, no suspicious skin lesions noted  Psych: Affect euthymic   Musculoskeletal:   Extremity:  Please see examination above. Compartment soft compressible. Skin is intact. Does wiggle her toes. Palpable DP and PT pulses. And an open reduction internal fixation right distal tibial pilon fracture. ELECTRONICALLY signed by:    Billy Sanchez MD  1/21/23   This is been dictated utilizing voice recognition software. All efforts have been made to make the note accurate although inadvertent errors may be present.

## 2023-01-22 LAB
ALBUMIN SERPL-MCNC: 3.3 G/DL (ref 3.5–5.2)
ALP BLD-CCNC: 51 U/L (ref 35–104)
ALT SERPL-CCNC: 17 U/L (ref 0–32)
ANION GAP SERPL CALCULATED.3IONS-SCNC: 6 MMOL/L (ref 7–16)
AST SERPL-CCNC: 21 U/L (ref 0–31)
BASOPHILS ABSOLUTE: 0.02 E9/L (ref 0–0.2)
BASOPHILS RELATIVE PERCENT: 0.2 % (ref 0–2)
BILIRUB SERPL-MCNC: <0.2 MG/DL (ref 0–1.2)
BILIRUBIN DIRECT: <0.2 MG/DL (ref 0–0.3)
BILIRUBIN, INDIRECT: ABNORMAL MG/DL (ref 0–1)
BUN BLDV-MCNC: 17 MG/DL (ref 6–20)
CALCIUM SERPL-MCNC: 8.9 MG/DL (ref 8.6–10.2)
CHLORIDE BLD-SCNC: 104 MMOL/L (ref 98–107)
CO2: 27 MMOL/L (ref 22–29)
CREAT SERPL-MCNC: 0.7 MG/DL (ref 0.5–1)
EOSINOPHILS ABSOLUTE: 0.01 E9/L (ref 0.05–0.5)
EOSINOPHILS RELATIVE PERCENT: 0.1 % (ref 0–6)
GFR SERPL CREATININE-BSD FRML MDRD: >60 ML/MIN/1.73
GLUCOSE BLD-MCNC: 84 MG/DL (ref 74–99)
HCT VFR BLD CALC: 35 % (ref 34–48)
HEMOGLOBIN: 11.2 G/DL (ref 11.5–15.5)
IMMATURE GRANULOCYTES #: 0.04 E9/L
IMMATURE GRANULOCYTES %: 0.4 % (ref 0–5)
LYMPHOCYTES ABSOLUTE: 1.75 E9/L (ref 1.5–4)
LYMPHOCYTES RELATIVE PERCENT: 18.9 % (ref 20–42)
MAGNESIUM: 2 MG/DL (ref 1.6–2.6)
MCH RBC QN AUTO: 30.5 PG (ref 26–35)
MCHC RBC AUTO-ENTMCNC: 32 % (ref 32–34.5)
MCV RBC AUTO: 95.4 FL (ref 80–99.9)
MONOCYTES ABSOLUTE: 0.54 E9/L (ref 0.1–0.95)
MONOCYTES RELATIVE PERCENT: 5.8 % (ref 2–12)
NEUTROPHILS ABSOLUTE: 6.92 E9/L (ref 1.8–7.3)
NEUTROPHILS RELATIVE PERCENT: 74.6 % (ref 43–80)
PDW BLD-RTO: 14.7 FL (ref 11.5–15)
PHOSPHORUS: 3.3 MG/DL (ref 2.5–4.5)
PLATELET # BLD: 279 E9/L (ref 130–450)
PMV BLD AUTO: 9.9 FL (ref 7–12)
POTASSIUM SERPL-SCNC: 4.3 MMOL/L (ref 3.5–5)
RBC # BLD: 3.67 E12/L (ref 3.5–5.5)
SODIUM BLD-SCNC: 137 MMOL/L (ref 132–146)
TOTAL PROTEIN: 6.8 G/DL (ref 6.4–8.3)
WBC # BLD: 9.3 E9/L (ref 4.5–11.5)

## 2023-01-22 PROCEDURE — 80076 HEPATIC FUNCTION PANEL: CPT

## 2023-01-22 PROCEDURE — 97165 OT EVAL LOW COMPLEX 30 MIN: CPT

## 2023-01-22 PROCEDURE — 97161 PT EVAL LOW COMPLEX 20 MIN: CPT

## 2023-01-22 PROCEDURE — 97530 THERAPEUTIC ACTIVITIES: CPT

## 2023-01-22 PROCEDURE — 6370000000 HC RX 637 (ALT 250 FOR IP): Performed by: STUDENT IN AN ORGANIZED HEALTH CARE EDUCATION/TRAINING PROGRAM

## 2023-01-22 PROCEDURE — 2580000003 HC RX 258: Performed by: STUDENT IN AN ORGANIZED HEALTH CARE EDUCATION/TRAINING PROGRAM

## 2023-01-22 PROCEDURE — 84100 ASSAY OF PHOSPHORUS: CPT

## 2023-01-22 PROCEDURE — G0378 HOSPITAL OBSERVATION PER HR: HCPCS

## 2023-01-22 PROCEDURE — 36415 COLL VENOUS BLD VENIPUNCTURE: CPT

## 2023-01-22 PROCEDURE — 6370000000 HC RX 637 (ALT 250 FOR IP): Performed by: INTERNAL MEDICINE

## 2023-01-22 PROCEDURE — 80048 BASIC METABOLIC PNL TOTAL CA: CPT

## 2023-01-22 PROCEDURE — 85025 COMPLETE CBC W/AUTO DIFF WBC: CPT

## 2023-01-22 PROCEDURE — 6360000002 HC RX W HCPCS: Performed by: STUDENT IN AN ORGANIZED HEALTH CARE EDUCATION/TRAINING PROGRAM

## 2023-01-22 PROCEDURE — 83735 ASSAY OF MAGNESIUM: CPT

## 2023-01-22 RX ADMIN — CEFAZOLIN 1000 MG: 1 INJECTION, POWDER, FOR SOLUTION INTRAMUSCULAR; INTRAVENOUS at 03:30

## 2023-01-22 RX ADMIN — ACETAMINOPHEN 650 MG: 325 TABLET ORAL at 21:44

## 2023-01-22 RX ADMIN — OXYCODONE HYDROCHLORIDE 10 MG: 10 TABLET ORAL at 18:28

## 2023-01-22 RX ADMIN — ACETAMINOPHEN 650 MG: 325 TABLET ORAL at 14:10

## 2023-01-22 RX ADMIN — OXYCODONE HYDROCHLORIDE 10 MG: 10 TABLET ORAL at 03:30

## 2023-01-22 RX ADMIN — ACETAMINOPHEN 650 MG: 325 TABLET ORAL at 09:26

## 2023-01-22 RX ADMIN — SODIUM CHLORIDE, PRESERVATIVE FREE 10 ML: 5 INJECTION INTRAVENOUS at 21:45

## 2023-01-22 RX ADMIN — ACETAMINOPHEN 650 MG: 325 TABLET ORAL at 03:30

## 2023-01-22 RX ADMIN — PANTOPRAZOLE SODIUM 40 MG: 40 TABLET, DELAYED RELEASE ORAL at 09:26

## 2023-01-22 RX ADMIN — ASPIRIN 81 MG CHEWABLE TABLET 81 MG: 81 TABLET CHEWABLE at 09:26

## 2023-01-22 ASSESSMENT — PAIN DESCRIPTION - DESCRIPTORS: DESCRIPTORS: PATIENT UNABLE TO DESCRIBE

## 2023-01-22 ASSESSMENT — PAIN DESCRIPTION - PAIN TYPE: TYPE: SURGICAL PAIN

## 2023-01-22 ASSESSMENT — PAIN DESCRIPTION - LOCATION: LOCATION: LEG

## 2023-01-22 ASSESSMENT — PAIN SCALES - GENERAL
PAINLEVEL_OUTOF10: 6
PAINLEVEL_OUTOF10: 4

## 2023-01-22 ASSESSMENT — PAIN SCALES - WONG BAKER
WONGBAKER_NUMERICALRESPONSE: 0
WONGBAKER_NUMERICALRESPONSE: 8

## 2023-01-22 ASSESSMENT — PAIN DESCRIPTION - FREQUENCY: FREQUENCY: CONTINUOUS

## 2023-01-22 ASSESSMENT — PAIN DESCRIPTION - ORIENTATION: ORIENTATION: LEFT;LOWER

## 2023-01-22 NOTE — ANESTHESIA PROCEDURE NOTES
Arterial Line:    An arterial line was placed using surface landmarks, in the procedure area for the following indication(s): continuous blood pressure monitoring and blood sampling needed. A 20 gauge (size), 1 and 3/4 inch (length), Angiocath (type) catheter was placed, Seldinger technique used, into the right radial artery, secured by Tegaderm. Events:  patient tolerated procedure well with no complications.   Anesthesiologist: Yanique Stoner MD  Preanesthetic Checklist  Completed: patient identified, IV checked, site marked, risks and benefits discussed, surgical/procedural consents, equipment checked, pre-op evaluation, timeout performed, anesthesia consent given, oxygen available, monitors applied/VS acknowledged, fire risk safety assessment completed and verbalized and blood product R/B/A discussed and consented

## 2023-01-22 NOTE — PROGRESS NOTES
Hospitalist Progress Note      Synopsis: Patient admitted by orthopedic surgery as a transfer from [facility] for a left tibial fracture. She underwent ORIF on  and her service was consulted postoperatively for \"medical management. \"  The patient and her mother report no past medical history, however, she is on PPI daily outpatient. This has been reordered for inpatient. Postoperative labs with mild postoperative anemia, otherwise, unremarkable. Hospital day 0     Subjective:  Stable overnight. No issues reported. Patient seen and examined. Lying in bed. Mother at bedside. Watching movies. No complaints. Records reviewed. Temp (24hrs), Av.7 °F (36.5 °C), Min:96.9 °F (36.1 °C), Max:99 °F (37.2 °C)    DIET: ADULT DIET; Regular  CODE: Full Code    Intake/Output Summary (Last 24 hours) at 2023 1116  Last data filed at 2023 1848  Gross per 24 hour   Intake 630 ml   Output 10 ml   Net 620 ml       Review of Systems: All bolded are positive; please see HPI  General:  Fever, chills, diaphoresis, fatigue, malaise, night sweats, weight loss  Psychological:  Anxiety, disorientation, hallucinations. ENT:  Epistaxis, headaches, vertigo, visual changes. Cardiovascular:  Chest pain, irregular heartbeats, palpitations, paroxysmal nocturnal dyspnea. Respiratory:  Shortness of breath, coughing, sputum production, hemoptysis, wheezing, orthopnea.   Gastrointestinal:  Nausea, vomiting, diarrhea, heartburn, constipation, abdominal pain, hematemesis, hematochezia, melena, acholic stools  Genito-Urinary:  Dysuria, urgency, frequency, hematuria  Musculoskeletal:  Joint pain, joint stiffness, joint swelling, muscle pain  Neurology:  Headache, focal neurological deficits, weakness, numbness, paresthesia  Derm:  Rashes, ulcers, excoriations, bruising  Extremities:  Decreased ROM, peripheral edema, mottling    Objective:    BP 98/65   Pulse 58   Temp 97.8 °F (36.6 °C) (Temporal)   Resp 16   Ht 5' 2\" (1.575 m)   Wt 162 lb (73.5 kg)   SpO2 97%   BMI 29.63 kg/m²     General appearance: Kingsley Rivas female in no apparent distress, appears stated age and cooperative. HEENT: Conjunctivae/corneas clear. Mucous membranes moist. + eyeglasses   Neck: Supple. No JVD. Respiratory:  Clear to auscultation bilaterally. Normal respiratory effort. Cardiovascular:  RRR. S1, S2 without MRG. PV: Pulses palpable. No edema. Abdomen: Soft, non-tender, non-distended. +BS  Musculoskeletal: No obvious deformities. Skin: Normal skin color. No rashes or lesions. Good turgor. Surgical site covered with dressing that is CDI. Neurologic:  Grossly non-focal. Awake, alert, following commands. Psychiatric: Alert and oriented, thought content appropriate, normal insight and judgement    Medications:  REVIEWED DAILY    Infusion Medications    sodium chloride       Scheduled Medications    sodium chloride flush  5-40 mL IntraVENous 2 times per day    acetaminophen  650 mg Oral Q6H    aspirin  81 mg Oral Daily    pantoprazole  40 mg Oral QAM AC     PRN Meds: sodium chloride flush, sodium chloride, ondansetron **OR** ondansetron, oxyCODONE **OR** oxyCODONE, morphine **OR** morphine    Labs:     Recent Labs     01/21/23  0554 01/22/23  0814   WBC 5.9 9.3   HGB 12.0 11.2*   HCT 36.0 35.0    279       Recent Labs     01/22/23  0814      K 4.3      CO2 27   BUN 17   CREATININE 0.7   CALCIUM 8.9   PHOS 3.3       Recent Labs     01/22/23  0814   PROT 6.8   ALKPHOS 51   ALT 17   AST 21   BILITOT <0.2       Recent Labs     01/21/23  0554   INR 1.1       No results for input(s): CKTOTAL, TROPONINI in the last 72 hours.     Chronic labs:    Lab Results   Component Value Date    INR 1.1 01/21/2023       Radiology: REVIEWED DAILY    Assessment:  L tibial fx s/p ORIF  Mild post operative anemia  GERD  Down's syndrome    Plan:  Orthopedic surgery attending - perioperative management  DVT prophylaxis per attending  PT/OT  PPI ordered for IP  Pt's chronic medical illnesses are well controlled  Our service has nothing further to add to patient's care, we will sign off      Thanks for allowing us to participate in the care of [facility]. Please do not hesitate to contact us if needed.  +++++++++++++++++++++++++++++++++++++++++++++++++  MAYRA Gutierrez/ Best Durbin [address] [place] [address]  +++++++++++++++++++++++++++++++++++++++++++++++++  NOTE: This report was transcribed using voice recognition software. Every effort was made to ensure accuracy; however, inadvertent computerized transcription errors may be present.

## 2023-01-22 NOTE — ANESTHESIA PROCEDURE NOTES
Central Venous Line:    A central venous line was placed using ultrasound guidance, in the OR for the following indication(s): central venous access and CVP monitoring. Sterility preparation included the following: hand hygiene performed prior to procedure, maximum sterile barriers used and sterile technique used to drape from head to toe. The patient was placed in supine position. The right internal jugular vein was prepped. The site was prepped with Chloraprep. A 7 Fr (size), 20 (length), triple lumen was placed. During the procedure, the following specific steps were taken: target vein identified, needle advanced into vein and blood aspirated and guidewire advanced into vein. Intravenous verification was obtained by venous blood return. Post insertion care included: all ports aspirated, all ports flushed easily, guidewire removed intact, Biopatch applied, line sutured in place and dressing applied. During the procedure the patient experienced: patient tolerated procedure well with no complications.       Outcomes: uncomplicatedno  Anesthesia type: local..No  Staffing  Performed: Anesthesiologist   Anesthesiologist: Padma Rodarte MD  Preanesthetic Checklist  Completed: patient identified, IV checked, site marked, risks and benefits discussed, surgical/procedural consents, equipment checked, pre-op evaluation, timeout performed, anesthesia consent given, oxygen available and monitors applied/VS acknowledged

## 2023-01-22 NOTE — ANESTHESIA POSTPROCEDURE EVALUATION
Department of Anesthesiology  Postprocedure Note    Patient: Radha Arevalo  MRN: [id number]  YOB: 1985  Date of evaluation: 1/21/2023      Procedure Summary     Date: 01/21/23 Room / Location: [facility] OR 08 / [facility]    Anesthesia Start: 6852 Anesthesia Stop: 1150    Procedure: left tibia open reduction with internal fixation (Left: Leg Lower) Diagnosis:       Closed fracture of shaft of left tibia, unspecified fracture morphology, initial encounter      (Closed fracture of shaft of left tibia, unspecified fracture morphology, initial encounter Kelly Lancaster)    Surgeons: Herbert Dave MD Responsible Provider: Pastor Lele MD    Anesthesia Type: General ASA Status: 3          Anesthesia Type: General    Ruddy Phase I: Ruddy Score: 10    Ruddy Phase II:        Anesthesia Post Evaluation    Patient location during evaluation: PACU  Patient participation: complete - patient participated  Level of consciousness: awake  Pain score: 2  Airway patency: patent  Nausea & Vomiting: no nausea  Complications: no  Cardiovascular status: hemodynamically stable  Respiratory status: acceptable  Hydration status: stable  Multimodal analgesia pain management approach

## 2023-01-22 NOTE — PROGRESS NOTES
Physical Therapy  Physical Therapy Initial Assessment     Name: Mily Messina  : 1985  MRN: [id number]      Date of Service: 2023    Evaluating PT:  Yolanda Haroon, PT, DPT [id number]    Room #:  2887/1808-K  Diagnosis:  Closed fracture of left tibia and fibula, initial encounter [S82.202A, S82.402A]  Tibia/fibula fracture, shaft, left, closed, initial encounter [S82.202A, S82.402A]  Closed left pilon fracture, initial encounter [S82.872A]  PMHx/PSHx:   has a past medical history of Chest discomfort, Down syndrome, Heartburn, and History of open heart surgery. has a past surgical history that includes Cardiac surgery; Cholecystectomy; knee surgery; Tonsillectomy; and Cardiac valuve replacement (). Procedure/Surgery:  Open reduction internal fixation left tibial pilon fracture, tibia only with plate and screws (3/69/1315)  Precautions:  Falls, NWB LLE, Hx of Down Syndrome  Equipment Needs:  FWW; TBD  Equipment Owned:  Standard walker    SUBJECTIVE:    Pt lives with mother and sister in a 2 story home with 2 stair(s) to enter and 0 rail(s). Bed is on 1st floor and bath is on 1st floor. Pt ambulated with no AD PTA. OBJECTIVE:   Initial Evaluation  Date: 2023 Treatment Short Term/ Long Term   Goals   AM-PAC 6 Clicks      Was pt agreeable to Eval/treatment? Yes     Does pt have pain? Mild LLE     Bed Mobility  Rolling: SBA  Supine to sit: Rosa  Sit to supine: Rosa  Scooting: Rosa (seated)  Rolling: Supervision  Supine to sit: Supervision  Sit to supine: Supervision  Scooting: Supervision   Transfers Sit to stand: Rosa (bed); ModA x2 (chair)  Stand to sit: [address] [facility] (chair); MaxA x2 (bed)  Stand pivot: ModA with WW (bed > chair);  MaxA x2 with [employer] (chair > bed)  Sit to stand: SBA  Stand to sit: SBA  Stand pivot: SBA with AAD   Ambulation    3 feet with [employer] ModA (bed > chair); 3 feet with [employer] MaxA x2 (chair > bed)  50 feet with AAD SBA   Stair negotiation: ascended and descended  NT  2 step(s) with 0 rail(s) + AAD SBA   ROM BUE:  See OT note  BLE:  WFL     Strength BUE:  See OT note  BLE:  WFL     Balance Sitting EOB:  SBA  Dynamic Standing:  ModA <> MaxA x2 with [employer]  Sitting EOB:  Supervision  Dynamic Standing:  SBA with AAD     Pt is A & O x 2 (self, place)  Sensation:  No reports of numbness/tingling to extremities  Edema:  Unremarkable    Patient education  Pt educated on role of PT, weight bearing status, safety during functional mobility, use of call light for assistance. Patient response to education:   Pt verbalized understanding Pt demonstrated skill Pt requires further education in this area   Yes Yes Yes     ASSESSMENT:    Conditions Requiring Skilled Therapeutic Intervention:    [x]Decreased strength     []Decreased ROM  [x]Decreased functional mobility  [x]Decreased balance   [x]Decreased endurance   []Decreased posture  []Decreased sensation  []Decreased coordination   []Decreased vision  [x]Decreased safety awareness   [x]Increased pain       Comments:  Session cleared by nursing. Patient in Champion's position upon arrival; agreeable to PT session with OT collaboration. Required increased time to perform bed mobility. Patient sat EOB for extended period of time. Required verbal cues related to positioning/sequencing to ensure safety during functional transfers. Ambulated via RLE hopping/shuffling with decreased speed and unsteadiness. Ambulated from bed to bedside chair. Following seated rest break, sit to stand transfer from chair attempted; unable to be completed with maximum assistance of one person. Second person required to assist with sit to stand transfer; subsequently ambulated from bedside chair to bed, requiring significantly increased assistance compared to initial ambulation bout. Upon returning to supine, rolling performed in order to place pad underneath patient. Patient left in Champion's position with LLE elevated, nursing present, call light in reach.  Instructed not to get up on own and to use call light for assistance. Patient would benefit from continued skilled PT services to address functional deficits and prevent deconditioning. Treatment:  Patient practiced and was instructed in the following treatment:    Bed mobility - verbal cues to facilitate proper positioning; physical assistance provided as needed during activity  Static/dynamic sitting - performed to promote activity tolerance and balance maintenance  Functional transfers - education and verbal cues to facilitate proper positioning and sequencing, particularly related to hand/foot placement; physical assistance provided as needed during activity  Ambulation - education and verbal cues to facilitate proper positioning and sequencing; physical assistance provided as needed during activity  Skilled positioning - patient positioned optimally to promote comfort    Pt's/ family goals   1. None stated    Prognosis is good for reaching above PT goals. Patient and or family understand(s) diagnosis, prognosis, and plan of care. Yes    PHYSICAL THERAPY PLAN OF CARE:    PT POC is established based on physician order and patient diagnosis     Referring provider/PT Order:    01/21/23 1400  PT evaluation and treat  Start:  01/21/23 1400,   End:  01/21/23 1400,   ONE TIME,   Standing Count:  1 Occurrences,   R         Suzette Closs, DO     Diagnosis:  Closed fracture of left tibia and fibula, initial encounter [S82.202A, S82.402A]  Tibia/fibula fracture, shaft, left, closed, initial encounter [S82.202A, S82.402A]  Closed left pilon fracture, initial encounter [B98.204G]  Specific instructions for next treatment:  Continue to facilitate safe performance of functional mobility; progress as appropriate.     Current Treatment Recommendations:     [x] Strengthening to improve independence with functional mobility   [] ROM to improve independence with functional mobility   [x] Balance Training to improve static/dynamic balance and to reduce fall risk  [x] Endurance Training to improve activity tolerance during functional mobility   [x] Transfer Training to improve safety and independence with all functional transfers   [x] Gait Training to improve gait mechanics, endurance and assess need for appropriate assistive device  [x] Stair Training in preparation for safe discharge home and/or into the community   [x] Positioning to prevent skin breakdown and contractures  [x] Safety and Education Training   [x] Patient/Caregiver Education   [] HEP  [] Other     PT long term treatment goals are located in above grid    Frequency of treatments: 2-5x/week x 1-2 weeks. Time in  0856  Time out  0931    Total Treatment Time  25 minutes     Evaluation Time includes thorough review of current medical information, gathering information on past medical history/social history and prior level of function, completion of standardized testing/informal observation of tasks, assessment of data and education on plan of care and goals.     CPT codes:  [x] Low Complexity PT evaluation 93113  [] Moderate Complexity PT evaluation 87419  [] High Complexity PT evaluation 76622  [] PT Re-evaluation 82495  [] Gait training 09795 0 minutes  [] Manual therapy 68766 0 minutes  [x] Therapeutic activities 28846 25 minutes  [] Therapeutic exercises 76691 0 minutes  [] Neuromuscular reeducation 81146 0 minutes     Dwayne Kong, PT, DPT  [id number]

## 2023-01-22 NOTE — PROGRESS NOTES
[address]          AWOW:8/15/3572                                                   Patient Name: Holli Greenfield     MRN: [id number]     : 1985     Room: [address] [place] [address]       Evaluating OT: Sarah Beth TRUONG, OTR/L, [id number]      Referring Provider: Crysatl Jacobo DO    Specific Provider Orders/Date: OT eval and treat (23)    Diagnosis: Closed fracture of left tibia and fibula, initial encounter [S82.202A, S82.402A]  Tibia/fibula fracture, shaft, left, closed, initial encounter [S82.202A, S82.402A]  Closed left pilon fracture, initial encounter [B27.209C]    Surgeries/Procedures: : Open reduction internal fixation left tibial pilon fracture, tibia only with plate and screws      Pt admitted after tripping over a dog bone resulting in L tibial pilon fx. Pertinent Medical History:       has a past medical history of Chest discomfort, Down syndrome, Heartburn, and History of open heart surgery.          Precautions:  Fall Risk, NWB LLE, Hx Down Syndrome     Assessment of current deficits    [x] Functional mobility  [x]ADLs  [x] Strength               [x]Cognition    [x] Functional transfers   [x] IADLs         [x] Safety Awareness   [x]Endurance    [x] Fine Coordination              [x] Balance      [] Vision/perception   [x]Sensation     [x]Gross Motor Coordination  [x] ROM  [] Delirium                   [] Motor Control     OT PLAN OF CARE   OT POC based on physician orders, patient diagnosis and results of clinical assessment    Frequency/Duration 1-3 days/wk for 2 weeks PRN   Specific OT Treatment Interventions to include:   * Instruction/training on adapted ADL techniques and AE recommendations to increase functional independence within precautions       * Training on energy conservation strategies, correct breathing pattern and techniques to improve independence/tolerance for self-care routine  * Functional transfer/mobility training/DME recommendations for increased independence, safety, and fall prevention  * Patient/Family education to increase follow through with safety techniques and functional independence  * Recommendation of environmental modifications for increased safety with functional transfers/mobility and ADLs  * Cognitive retraining/development of therapeutic activities to improve problem solving, judgement, memory, and attention for increased safety/participation in ADL/IADL tasks  * Splinting/positioning for increased function, prevention of contractures, and improve skin integrity  * Therapeutic exercise to improve motor endurance, ROM, and functional strength for ADLs/functional transfers  * Therapeutic activities to facilitate/challenge dynamic balance, stand tolerance for increased safety and independence with ADLs  * Therapeutic activities to facilitate gross/fine motor skills for increased independence with ADLs  * Positioning to improve skin integrity, interaction with environment and functional independence    Recommended Adaptive Equipment/DME: Tub bench, TBD     Home Living: Pt lives with sisters and mother in 2 story home with 2 SUSAN and bed and bath on main level. Bathroom setup: tub/shower   Equipment owned: Standard walker    Prior Level of Function: Assist with ADLs , Assist with IADLs; Used No AD for functional mobility. Driving: No  Occupation: None stated    Pain Level: Pt c/o L ankle pain at surgical site. Pt appropriately positioned to alleviate. Cognition: A&O: ~2/4; Follows 1 step directions with re-direction.    Memory: F+   Sequencing: F   Problem solving: F   Judgement/safety: F    Vitals Assessed: NT  SpO2:      BP:      Functional Assessment:  AM-PAC Daily Activity Raw Score: 14/24   Initial Eval Status  Date: 1/22/23 Treatment Status  Date: STGs = LTGs  Time frame: 10-14 days   Feeding Set-up     Independent Grooming Stand by Assist   In supported sitting  Modified Independent  seated   UB Dressing Min A  Seated at EOB     Independent    LB Dressing Maximal Assist overall  SBA to don/doff R sock in long sit    Minimal Assist    Bathing Maximal Assist    Minimal Assist    Toileting Dependent     Minimal Assist    Bed Mobility  Rolling: SBA  Supine to sit: Minimal Assist   Sit to supine: Minimal Assist     Supine to sit: Independent   Sit to supine: Independent    Functional Transfers Sit to stand (bed):Minimal Assist  Sit to stand (chair): Mod A x2    Stand to sit: Minimal Assist  Stand to sit (chair): Mod A x2    Stand pivot (bed to chair): Moderate Assist    Stand pivot (chair to bed): Max A x2     Stand by Assist    Functional Mobility NT  Pt unable     Stand by Assist with FWW   Balance Sitting:     Static: SBA    Dynamic: Min A  Standing: Mod A>Max A x2    during functional activity  Sitting:     Static: Ind    Dynamic: S  Standing: SBA   Activity Tolerance Fair with light activity  WFL  For full ADL   Visual/  Perceptual Glasses: Yes        Safety F  F+     Hand Dominance: R   AROM (PROM) Strength Additional Info:    RUE  WFL Grossly 3+/5 Fair  and wfl FMC/dexterity noted during ADL tasks       LUE WFL Grossly 3+/5 Fair  and wfl FMC/dexterity noted during ADL tasks       Hearing: WFL   Sensation:  No c/o numbness or tingling  Tone: WFL   Edema: Unremarkable    Comments: Patient cleared by nursing. Upon arrival patient semi supine in bed, educated on the role of OT, and agreeable to OT session. Mother present for session today. OT facilitated ADLs, bed mobility, functional transfers with focus on safety, body mechanics, and precautions. Pt requiring moderate cuing for safe completion of tasks, unable to teach-back following education.  At end of session, patient seated upright in bed with LLE supported , properly positioned, oriented to call light, with call light to R side and phone within reach, all lines and tubes intact. Nursing notified. Overall patient demonstrated fair- reception to education and decreased independence and safety during completion of ADL/functional transfer/mobility tasks. Pt would benefit from continued skilled OT to increase safety and independence with completion of ADL/IADL tasks for functional independence and quality of life. Treatment: OT treatment provided this date includes:   Instruction/training on safety and adapted techniques for completion of ADLs: Pt educated on use of tub bench for home use to increase independence and safety in self-care. Instruction/training on safe functional mobility/transfer techniques: Pt educated on precautions prior to mobility with focus on safety, body mechanics, and precautions   Proper Positioning/Alignment: pt properly position in supported sitting in bed with LLE supported for optimal healing and to decrease edema. Therapeutic activity: to challenge dynamic sitting/standing balance and endurance to promote safety during ADL tasks and functional transfers and mobility. Rehab Potential: Good for established goals     Patient / Family Goal: to return [facility]      Patient and/or family were instructed on functional diagnosis, prognosis/goals and OT plan of care. Demonstrated fair- understanding.      Eval Complexity: Low    Time In: 0901  Time Out: 0929  Total Treatment Time: 12 min    Min Units   OT Eval Low 22025 x      OT Eval Medium 24968      OT Eval High 93226      OT Re-Eval [id number]       Therapeutic Ex 84534       Therapeutic Activities 96702 12  1    ADL/Self Care 70033       Orthotic Management 11477       Manual 91284     Neuro Re-Ed 46249       Non-Billable Time          Evaluation Time additionally includes thorough review of current medical information, gathering information on past medical history/social history and prior level of function, interpretation of standardized testing/informal observation of tasks, assessment of data and development of plan of care and goals.           Lois Royal, OTD,  OTR/L; [id number]

## 2023-01-22 NOTE — PROGRESS NOTES
Department of Orthopedic Surgery  Resident Progress Note    Patient seen and examined. Pain controlled. No new complaints. Denies chest pain, shortness of breath, dizziness/lightheadedness. + Flatulence, - BM    Severe the patient and her caregiver in the room at bedside states that her pain is well controlled. She has not been urinating nor passing gas. Per nursing report she has not been drinking many fluids since surgery.     VITALS:  BP 98/65   Pulse 58   Temp 97.8 °F (36.6 °C) (Temporal)   Resp 16   Ht 5' 2\" (1.575 m)   Wt 162 lb (73.5 kg)   SpO2 97%   BMI 29.63 kg/m²     General: alert and oriented to person, place and time, well-developed and well-nourished, in no acute distress    MUSCULOSKELETAL:   left lower extremity:  Dressing C/D/I  Compartments soft and compressible  + Plantar and dorsiflex his toes  +2/4 DP  pulse, Brisk Cap refill, Toes warm and perfused  Distal sensation grossly intact to Peroneals, Sural, Saphenous, and tibial nrs    CBC:   Lab Results   Component Value Date/Time    WBC 5.9 01/21/2023 05:54 AM    HGB 12.0 01/21/2023 05:54 AM    HCT 36.0 01/21/2023 05:54 AM     01/21/2023 05:54 AM     PT/INR:    Lab Results   Component Value Date/Time    PROTIME 12.4 01/21/2023 05:54 AM    INR 1.1 01/21/2023 05:54 AM         ASSESSMENT  S/P O reduction internal fixation of left tibial shaft fracture- 1/21/23    PLAN      Continue physical therapy and protocol: NWB - LLE  24 hour abx coverage  Deep venous thrombosis prophylaxis -aspirin, early mobilization  Continue ice and elevation to affected extremity  PT/OT  Pain Control: IV and PO, wean to orals as able  Monitor H&H  D/C Plan:  Home Health

## 2023-01-23 PROCEDURE — 6370000000 HC RX 637 (ALT 250 FOR IP): Performed by: STUDENT IN AN ORGANIZED HEALTH CARE EDUCATION/TRAINING PROGRAM

## 2023-01-23 PROCEDURE — G0378 HOSPITAL OBSERVATION PER HR: HCPCS

## 2023-01-23 PROCEDURE — 2580000003 HC RX 258: Performed by: STUDENT IN AN ORGANIZED HEALTH CARE EDUCATION/TRAINING PROGRAM

## 2023-01-23 PROCEDURE — 6370000000 HC RX 637 (ALT 250 FOR IP): Performed by: INTERNAL MEDICINE

## 2023-01-23 RX ADMIN — OXYCODONE HYDROCHLORIDE 10 MG: 10 TABLET ORAL at 16:32

## 2023-01-23 RX ADMIN — OXYCODONE HYDROCHLORIDE 10 MG: 10 TABLET ORAL at 01:37

## 2023-01-23 RX ADMIN — OXYCODONE HYDROCHLORIDE 10 MG: 10 TABLET ORAL at 07:27

## 2023-01-23 RX ADMIN — ACETAMINOPHEN 650 MG: 325 TABLET ORAL at 01:37

## 2023-01-23 RX ADMIN — ACETAMINOPHEN 650 MG: 325 TABLET ORAL at 07:26

## 2023-01-23 RX ADMIN — ACETAMINOPHEN 650 MG: 325 TABLET ORAL at 14:04

## 2023-01-23 RX ADMIN — ACETAMINOPHEN 650 MG: 325 TABLET ORAL at 20:59

## 2023-01-23 RX ADMIN — SODIUM CHLORIDE, PRESERVATIVE FREE 10 ML: 5 INJECTION INTRAVENOUS at 07:27

## 2023-01-23 RX ADMIN — ASPIRIN 81 MG CHEWABLE TABLET 81 MG: 81 TABLET CHEWABLE at 07:27

## 2023-01-23 RX ADMIN — PANTOPRAZOLE SODIUM 40 MG: 40 TABLET, DELAYED RELEASE ORAL at 07:26

## 2023-01-23 RX ADMIN — SODIUM CHLORIDE, PRESERVATIVE FREE 10 ML: 5 INJECTION INTRAVENOUS at 21:10

## 2023-01-23 ASSESSMENT — PAIN SCALES - GENERAL
PAINLEVEL_OUTOF10: 7
PAINLEVEL_OUTOF10: 5
PAINLEVEL_OUTOF10: 4
PAINLEVEL_OUTOF10: 3
PAINLEVEL_OUTOF10: 6
PAINLEVEL_OUTOF10: 7

## 2023-01-23 ASSESSMENT — PAIN DESCRIPTION - DESCRIPTORS
DESCRIPTORS: ACHING;DISCOMFORT;SORE
DESCRIPTORS: PATIENT UNABLE TO DESCRIBE

## 2023-01-23 ASSESSMENT — PAIN - FUNCTIONAL ASSESSMENT: PAIN_FUNCTIONAL_ASSESSMENT: PREVENTS OR INTERFERES SOME ACTIVE ACTIVITIES AND ADLS

## 2023-01-23 ASSESSMENT — PAIN DESCRIPTION - LOCATION
LOCATION: LEG
LOCATION: LEG

## 2023-01-23 ASSESSMENT — PAIN DESCRIPTION - ORIENTATION
ORIENTATION: LEFT
ORIENTATION: LEFT

## 2023-01-23 NOTE — PROGRESS NOTES
Department of Orthopedic Surgery  Resident Progress Note    Patient seen and examined. Pain controlled. No new complaints. Family by bedside requesting acute rehab transitioning because her steps at home. Denies chest pain, shortness of breath, dizziness/lightheadedness.  + Flatulence, - BM    VITALS:  /64   Pulse 62   Temp 98 °F (36.7 °C) (Temporal)   Resp 16   Ht 5' 2\" (1.575 m)   Wt 162 lb (73.5 kg)   SpO2 96%   BMI 29.63 kg/m²     General: alert and oriented to person, place and time, well-developed and well-nourished, in no acute distress    MUSCULOSKELETAL:   left lower extremity:  Dressing C/D/I  Compartments soft and compressible  + Plantar and dorsiflex his toes  +2/4 DP  pulse, Brisk Cap refill, Toes warm and perfused  Distal sensation grossly intact to Peroneals, Sural, Saphenous, and tibial nrs    CBC:   Lab Results   Component Value Date/Time    WBC 9.3 01/22/2023 08:14 AM    HGB 11.2 01/22/2023 08:14 AM    HCT 35.0 01/22/2023 08:14 AM     01/22/2023 08:14 AM     PT/INR:    Lab Results   Component Value Date/Time    PROTIME 12.4 01/21/2023 05:54 AM    INR 1.1 01/21/2023 05:54 AM         ASSESSMENT  S/P O reduction internal fixation of left tibial shaft fracture- 1/21/23    PLAN      Continue physical therapy and protocol: NWB - LLE  24 hour abx coverage completed  Deep venous thrombosis prophylaxis -aspirin, early mobilization  Continue ice and elevation to affected extremity  PT/OT AMPA 12  Pain Control: IV and PO, wean to orals as able  Monitor H&H  Social work consulted for discharge planning  D/C Plan:  Home Health

## 2023-01-23 NOTE — ACP (ADVANCE CARE PLANNING)
Advance Care Planning   Healthcare Decision Maker:    Primary Decision Maker: González Cameron - Parent, Legal Guardian - [phone]    Secondary Decision Maker: David Silvestre - Other - [phone]    Click here to complete Healthcare Decision Makers including selection of the Healthcare Decision Maker Relationship (ie \"Primary\"). Patient has a Guardianship in place second to Down Syndrome.

## 2023-01-23 NOTE — CARE COORDINATION
1/23/23  Spoke with patient and her mother/guardian ,Marleny Buitrago regarding transition of care. Patient admitted for a left fibula/tibia fracture and is s/p ORIF form 1/21. Patient was evaluated by therapy with AM-PAC scores of 12/24 for PT and 14/24 for OT. Patients mother states that patient lives at home with her and her her sister in a 2 story home with 2 steps to enter and a rail. Bed and bath are on the first floor. Patient owns a standard walker . Patient has a history of Down Syndrome and karri s mother is requesting a [facility] stay prior to returning home for strengthening. Family was provided a [facility] list to review and this SW will follow up on choices and make referrals once destinations are provided. 3:50pm  Call placed to [facility] that family chose as [facility] choice. [facility] unable to accept as they are not in network with patients insurance. Family calling insurance provider for a facility [facility] list and will make additional choices in the AM.    Electronically signed by STEPHANE Dang on 1/23/2023 at 10:46 AM     The Plan for Transition of Care is related to the following treatment goals: [facility]    The Patient and/or patient representative Daisha Jaqruin was provided with a choice of provider and agrees   with the discharge plan. [x] Yes [] No    Freedom of choice list was provided with basic dialogue that supports the patient's individualized plan of care/goals, treatment preferences and shares the quality data associated with the providers.  [x] Yes [] No

## 2023-01-24 PROBLEM — S82.202A CLOSED FRACTURE OF LEFT TIBIA AND FIBULA, INITIAL ENCOUNTER: Status: ACTIVE | Noted: 2023-01-24

## 2023-01-24 PROBLEM — S82.402A CLOSED FRACTURE OF LEFT TIBIA AND FIBULA, INITIAL ENCOUNTER: Status: ACTIVE | Noted: 2023-01-24

## 2023-01-24 PROCEDURE — 6370000000 HC RX 637 (ALT 250 FOR IP): Performed by: STUDENT IN AN ORGANIZED HEALTH CARE EDUCATION/TRAINING PROGRAM

## 2023-01-24 PROCEDURE — 1200000000 HC SEMI PRIVATE

## 2023-01-24 PROCEDURE — 6370000000 HC RX 637 (ALT 250 FOR IP)

## 2023-01-24 PROCEDURE — 97535 SELF CARE MNGMENT TRAINING: CPT

## 2023-01-24 PROCEDURE — 97530 THERAPEUTIC ACTIVITIES: CPT

## 2023-01-24 RX ORDER — MAGNESIUM HYDROXIDE/ALUMINUM HYDROXICE/SIMETHICONE 120; 1200; 1200 MG/30ML; MG/30ML; MG/30ML
5 SUSPENSION ORAL EVERY 6 HOURS PRN
Status: DISCONTINUED | OUTPATIENT
Start: 2023-01-24 | End: 2023-01-25 | Stop reason: HOSPADM

## 2023-01-24 RX ADMIN — ALUMINUM HYDROXIDE, MAGNESIUM HYDROXIDE, AND SIMETHICONE 5 ML: 200; 200; 20 SUSPENSION ORAL at 13:21

## 2023-01-24 RX ADMIN — ASPIRIN 81 MG CHEWABLE TABLET 81 MG: 81 TABLET CHEWABLE at 07:58

## 2023-01-24 RX ADMIN — ACETAMINOPHEN 650 MG: 325 TABLET ORAL at 13:24

## 2023-01-24 RX ADMIN — OXYCODONE HYDROCHLORIDE 10 MG: 10 TABLET ORAL at 17:26

## 2023-01-24 RX ADMIN — ACETAMINOPHEN 650 MG: 325 TABLET ORAL at 07:58

## 2023-01-24 RX ADMIN — OXYCODONE HYDROCHLORIDE 10 MG: 10 TABLET ORAL at 21:47

## 2023-01-24 RX ADMIN — ACETAMINOPHEN 650 MG: 325 TABLET ORAL at 20:26

## 2023-01-24 RX ADMIN — OXYCODONE 5 MG: 5 TABLET ORAL at 02:01

## 2023-01-24 RX ADMIN — OXYCODONE HYDROCHLORIDE 10 MG: 10 TABLET ORAL at 07:58

## 2023-01-24 RX ADMIN — ONDANSETRON 4 MG: 4 TABLET, ORALLY DISINTEGRATING ORAL at 21:47

## 2023-01-24 RX ADMIN — ACETAMINOPHEN 650 MG: 325 TABLET ORAL at 02:02

## 2023-01-24 ASSESSMENT — PAIN DESCRIPTION - PAIN TYPE
TYPE: SURGICAL PAIN

## 2023-01-24 ASSESSMENT — PAIN DESCRIPTION - ORIENTATION
ORIENTATION: LEFT

## 2023-01-24 ASSESSMENT — PAIN DESCRIPTION - FREQUENCY
FREQUENCY: INTERMITTENT
FREQUENCY: INTERMITTENT
FREQUENCY: CONTINUOUS

## 2023-01-24 ASSESSMENT — PAIN DESCRIPTION - LOCATION
LOCATION: LEG

## 2023-01-24 ASSESSMENT — PAIN SCALES - GENERAL
PAINLEVEL_OUTOF10: 6
PAINLEVEL_OUTOF10: 8
PAINLEVEL_OUTOF10: 3
PAINLEVEL_OUTOF10: 7

## 2023-01-24 ASSESSMENT — PAIN DESCRIPTION - ONSET
ONSET: GRADUAL
ONSET: GRADUAL

## 2023-01-24 ASSESSMENT — PAIN DESCRIPTION - DESCRIPTORS
DESCRIPTORS: ACHING;DISCOMFORT;SORE
DESCRIPTORS: ACHING;SORE;SQUEEZING
DESCRIPTORS: ACHING;STABBING;SORE
DESCRIPTORS: ACHING;DISCOMFORT;SORE
DESCRIPTORS: ACHING;DISCOMFORT;SORE

## 2023-01-24 NOTE — PROGRESS NOTES
Physical Therapy  Rx    Name: Jose Miguel Hollis  : 1985  MRN: [id number]      Date of Service: 2023    Evaluating PT:  Agata Jc PT, DPT [id number]    Room #:  4223/0269-G  Diagnosis:  Closed fracture of left tibia and fibula, initial encounter [S82.202A, S82.402A]  Tibia/fibula fracture, shaft, left, closed, initial encounter [S82.202A, S82.402A]  Closed left pilon fracture, initial encounter [S82.872A]  PMHx/PSHx:   has a past medical history of Chest discomfort, Down syndrome, Heartburn, and History of open heart surgery. has a past surgical history that includes Cardiac surgery; Cholecystectomy; knee surgery; Tonsillectomy; Cardiac valuve replacement (); and Tibia fracture surgery (Left, 2023). Procedure/Surgery:  Open reduction internal fixation left tibial pilon fracture, tibia only with plate and screws (3/91/7524)  Precautions:  Falls, NWB LLE, Hx of Down Syndrome  Equipment Needs:  FWW; TBD  Equipment Owned:  Standard walker    SUBJECTIVE:    Pt lives with mother and sister in a 2 story home with 2 stair(s) to enter and 0 rail(s). Bed is on 1st floor and bath is on 1st floor. Pt ambulated with no AD PTA. OBJECTIVE:   Initial Evaluation  Date: 2023 Treatment  23 Short Term/ Long Term   Goals   AM-PAC 6 Clicks  52/98    Was pt agreeable to Eval/treatment? Yes yes    Does pt have pain? Mild LLE     Bed Mobility  Rolling: SBA  Supine to sit: Rosa  Sit to supine: Rosa  Scooting: Rosa (seated) Rolling: SBA  Supine to sit: Rosa  Sit to supine: Rosa  Scooting: Rosa (seated) Rolling: Supervision  Supine to sit: Supervision  Sit to supine: Supervision  Scooting: Supervision   Transfers Sit to stand: Rosa (bed); ModA x2 (chair)  Stand to sit: [address] [employer] (chair); MaxA x2 (bed)  Stand pivot: ModA with WW (bed > chair); MaxA x2 with [employer] (chair > bed) Sit to stand: Rosa (bed); ModA x2 (chair)  Stand to sit: [address] [employer] (chair); Stand pivot: ModA with [employer] x 2 with fww.   Sit to stand: SBA  Stand to sit: SBA  Stand pivot: SBA with AAD   Ambulation    3 feet with [employer] ModA (bed > chair); 3 feet with [employer] MaxA x2 (chair > bed) Only transfer to bedside chair. 48 feet with AAD SBA   Stair negotiation: ascended and descended  NT NT 2 step(s) with 0 rail(s) + AAD SBA   ROM BUE:  See OT note  BLE:  WFL     Strength BUE:  See OT note  BLE:  WFL     Balance Sitting EOB:  SBA  Dynamic Standing:  ModA <> MaxA x2 with [employer]  Sitting EOB:  Supervision  Dynamic Standing:  SBA with AAD     Pt is A & O x 2 (self, place)  Sensation:  No reports of numbness/tingling to extremities  Edema:  Unremarkable    Patient education  Pt educated on role of PT, weight bearing status, safety during functional mobility, use of call light for assistance. Patient response to education:   Pt verbalized understanding Pt demonstrated skill Pt requires further education in this area   Yes Yes Yes     ASSESSMENT:    Conditions Requiring Skilled Therapeutic Intervention:    [x]Decreased strength     []Decreased ROM  [x]Decreased functional mobility  [x]Decreased balance   [x]Decreased endurance   []Decreased posture  []Decreased sensation  []Decreased coordination   []Decreased vision  [x]Decreased safety awareness   [x]Increased pain       Comments:  Session cleared by nursing. Patient in Champion's position upon arrival; agreeable to PT session. with OT collaboration. Patient sat EOB then completed transfer to bedside chair requiring Mod A. Fww utilized with cues for sequencing. for extended period of time. Required verbal cues related to positioning/sequencing to ensure safety during functional transfers. Patient would benefit from continued skilled PT services to address functional deficits and prevent deconditioning.     Treatment:  Patient practiced and was instructed in the following treatment:    Bed mobility - verbal cues to facilitate proper positioning; physical assistance provided as needed during activity  Static/dynamic sitting - performed to promote activity tolerance and balance maintenance  Functional transfers - education and verbal cues to facilitate proper positioning and sequencing, particularly related to hand/foot placement; physical assistance provided as needed during activity  Ambulation - education and verbal cues to facilitate proper positioning and sequencing; physical assistance provided as needed during activity  Skilled positioning - patient positioned optimally to promote comfort    Pt's/ family goals   1. None stated    Prognosis is good for reaching above PT goals. Patient and or family understand(s) diagnosis, prognosis, and plan of care. Yes    PHYSICAL THERAPY PLAN OF CARE:    PT POC is established based on physician order and patient diagnosis     Referring provider/PT Order:    01/21/23 1400  PT evaluation and treat  Start:  01/21/23 1400,   End:  01/21/23 1400,   ONE TIME,   Standing Count:  1 Occurrences,   R         Rasheeda Rowan, DO     Diagnosis:  Closed fracture of left tibia and fibula, initial encounter [S82.202A, S82.402A]  Tibia/fibula fracture, shaft, left, closed, initial encounter [S82.202A, S82.402A]  Closed left pilon fracture, initial encounter [W33.988O]  Specific instructions for next treatment:  Continue to facilitate safe performance of functional mobility; progress as appropriate.     Current Treatment Recommendations:     [x] Strengthening to improve independence with functional mobility   [] ROM to improve independence with functional mobility   [x] Balance Training to improve static/dynamic balance and to reduce fall risk  [x] Endurance Training to improve activity tolerance during functional mobility   [x] Transfer Training to improve safety and independence with all functional transfers   [x] Gait Training to improve gait mechanics, endurance and assess need for appropriate assistive device  [x] Stair Training in preparation for safe discharge home and/or into the community   [x] Positioning to prevent skin breakdown and contractures  [x] Safety and Education Training   [x] Patient/Caregiver Education   [] HEP  [] Other     PT long term treatment goals are located in above grid    Frequency of treatments: 2-5x/week x 1-2 weeks. Time in  1515  Time out  1540    Total Treatment Time  25 minutes     Evaluation Time includes thorough review of current medical information, gathering information on past medical history/social history and prior level of function, completion of standardized testing/informal observation of tasks, assessment of data and education on plan of care and goals.     CPT codes:  [] Low Complexity PT evaluation 93353  [] Moderate Complexity PT evaluation 12133  [] High Complexity PT evaluation 02880  [] PT Re-evaluation 98732  [] Gait training 53124 0 minutes  [] Manual therapy 01511 0 minutes  [x] Therapeutic activities 26318 25 minutes  [] Therapeutic exercises 56884 0 minutes  [] Neuromuscular reeducation 01291 0 minutes

## 2023-01-24 NOTE — PROGRESS NOTES
Sent message to attending requesting something for heartburn. Pt mother would like something in liquid form. Awating reply.

## 2023-01-24 NOTE — PROGRESS NOTES
OCCUPATIONAL THERAPY TREATMENT NOTE    [facility] TREATMENT NOTE      Date:2023  Patient Name: Bonita Holly  MRN: [id number]  : 1985  Room: [address] [place], [place] [address]     Evaluating OT: Gutierrez TRUONG, OTR/L, [id number]       Referring Provider: Bhumi Toussaint DO    Specific Provider Orders/Date: OT eval and treat (23)    Diagnosis: Closed fracture of left tibia and fibula, initial encounter [S82.202A, S82.402A]  Tibia/fibula fracture, shaft, left, closed, initial encounter [S82.202A, S82.402A]  Closed left pilon fracture, initial encounter [M21.503Q]    Surgeries/Procedures: : Open reduction internal fixation left tibial pilon fracture, tibia only with plate and screws       Pt admitted after tripping over a dog bone resulting in L tibial pilon fx. Pertinent Medical History:       has a past medical history of Chest discomfort, Down syndrome, Heartburn, and History of open heart surgery.         Precautions:  Fall Risk, NWB LLE, Hx Down Syndrome      Assessment of current deficits    [x] Functional mobility            [x]ADLs           [x] Strength                   [x]Cognition    [x] Functional transfers          [x] IADLs          [x] Safety Awareness   [x]Endurance    [x] Fine Coordination              [x] Balance      [] Vision/perception    [x]Sensation      [x]Gross Motor Coordination  [x] ROM           [] Delirium                   [] Motor Control      OT PLAN OF CARE   OT POC based on physician orders, patient diagnosis and results of clinical assessment     Frequency/Duration 1-3 days/wk for 2 weeks PRN   Specific OT Treatment Interventions to include:   * Instruction/training on adapted ADL techniques and AE recommendations to increase functional independence within precautions       * Training on energy conservation strategies, correct breathing pattern and techniques to improve independence/tolerance for self-care routine  * Functional transfer/mobility training/DME recommendations for increased independence, safety, and fall prevention  * Patient/Family education to increase follow through with safety techniques and functional independence  * Recommendation of environmental modifications for increased safety with functional transfers/mobility and ADLs  * Cognitive retraining/development of therapeutic activities to improve problem solving, judgement, memory, and attention for increased safety/participation in ADL/IADL tasks  * Splinting/positioning for increased function, prevention of contractures, and improve skin integrity  * Therapeutic exercise to improve motor endurance, ROM, and functional strength for ADLs/functional transfers  * Therapeutic activities to facilitate/challenge dynamic balance, stand tolerance for increased safety and independence with ADLs  * Therapeutic activities to facilitate gross/fine motor skills for increased independence with ADLs  * Positioning to improve skin integrity, interaction with environment and functional independence     Recommended Adaptive Equipment/DME: Tub bench, TBD      Home Living: Pt lives with sisters and mother in 2 story home with 2 SUSAN and bed and bath on main level.   Bathroom setup: tub/shower   Equipment owned: Standard walker     Prior Level of Function: Assist with ADLs , Assist with IADLs; used No AD for functional mobility.  Driving: No  Occupation: None stated     Pain Level: Pt with no c/o pain this day  Cognition: A&O: ~2/4; Follows 1 step directions with re-direction.              Memory: F+              Sequencing: F              Problem solving: F              Judgement/safety: F     Vitals Assessed: spO2 on RA & HR WFL                Functional Assessment:  AM-PAC Daily Activity Raw Score: 14/24    Initial Eval Status  Date: 1/22/23 Treatment Status  Date: 1-24-23 STGs = LTGs  Time frame: 10-14 days   Feeding Set-up      set up seated   in chair Independent    Grooming Stand by  Assist   In supported sitting  SBA Modified Independent  seated   UB Dressing Min A  Seated at EOB      Min A to change gown Independent    LB Dressing Maximal Assist overall  SBA to don/doff R sock in long sit     Max A with sock   & brief Minimal Assist    Bathing Maximal Assist     Max A per last tx. Minimal Assist    Toileting Dependent      Dep with hygiene, bedpan use & brief/ clothing mgmt. Minimal Assist    Bed Mobility  Rolling: SBA  Supine to sit: Minimal Assist   Sit to supine: Minimal Assist    Rolling: SBA  Supine to sit: Minimal Assist   Sit to supine: Minimal Assist  Supine to sit: Independent   Sit to supine: Independent    Functional Transfers Sit to stand (bed):Minimal Assist  Sit to stand (chair): Mod A x2     Stand to sit: Minimal Assist  Stand to sit (chair): Mod A x2     Stand pivot (bed to chair): Moderate Assist     Stand pivot (chair to bed): Max A x2     Sit <> Stand: Mod A x2 with ww    SPT: Mod A x2 with ww from EOB to bedside chair. Stand by Assist    Functional Mobility NT  Pt unable      Mod A x2 with 2 small hops/ shuffle steps using ww, able to maintain NWB on L LE. Stand by Assist with FWW   Balance Sitting:     Static: SBA    Dynamic: Min A  Standing: Mod A>Max A x2    during functional activity Sitting:     Static: SBA    Dynamic: Min A  Standing: Mod A x2    during functional activity  Sitting:     Static: Ind    Dynamic: S  Standing: SBA   Activity Tolerance Fair with light activity  Fair WFL  For full ADL   Visual/  Perceptual Glasses: Yes          Safety F    Fair with cues for ww technique & reaching back for surface of chair/ transfer techniques. Pt. Ainsley Tran maintained NWB L LE while up F+     Comments: Upon arrival pt supine in bed, on bedpan, mother present, agreeable to OT, cleared by RN. Therapist assisted with ADLs/bedpan at bed level, ADLs in long-sitting & EOB level, functional transfer/mobility training with focus on safety, technique, precautions.   Pt. & mother Instructed RE: safe transfers/mobility, ADLs, role of OT, treatment plan, recs. , prec. At end of session pt. Seated up in chair with L LE elevated & ice applied, all needs met, RN notified, mother agreed to supervise pt. while up, all needs met, all lines and tubes intact, call light within reach. Pt has made F/+ progress towards set goals. Continue with current plan of care    Treatment Time In: 15:15            Treatment Time Out: 15:40                Treatment Charges: Mins Units   Ther Ex  99068     Manual Therapy 93563     Thera Activities 52445 10 1   ADL/Home Mgt 33814 15 1   Neuro Re-ed 91143     Group Therapy      Orthotic manage/training  66662     Non-Billable Time     Total Timed Treatment 25 2     Thank you,  Helga Rogers, OTR/L   License #  [id number]

## 2023-01-24 NOTE — PROGRESS NOTES
Department of Orthopedic Surgery  Resident Progress Note    Patient seen and examined. Pain controlled. No new complaints. Denies chest pain, shortness of breath, dizziness/lightheadedness. + Flatulence, - BM    VITALS:  /78   Pulse 86   Temp 97.4 °F (36.3 °C) (Temporal)   Resp 16   Ht 5' 2\" (1.575 m)   Wt 162 lb (73.5 kg)   SpO2 95%   BMI 29.63 kg/m²     General: alert and oriented to person, place and time, well-developed and well-nourished, in no acute distress    MUSCULOSKELETAL:   left lower extremity:  Dressing C/D/I  Compartments soft and compressible  + Plantar and dorsiflex his toes  +2/4 DP  pulse, Brisk Cap refill, Toes warm and perfused  Distal sensation grossly intact to Peroneals, Sural, Saphenous, and tibial nrs    CBC:   Lab Results   Component Value Date/Time    WBC 9.3 01/22/2023 08:14 AM    HGB 11.2 01/22/2023 08:14 AM    HCT 35.0 01/22/2023 08:14 AM     01/22/2023 08:14 AM     PT/INR:    Lab Results   Component Value Date/Time    PROTIME 12.4 01/21/2023 05:54 AM    INR 1.1 01/21/2023 05:54 AM         ASSESSMENT  S/P O reduction internal fixation of left tibial shaft fracture- 1/21/23    PLAN      Continue physical therapy and protocol: NWB - LLE  24 hour abx coverage completed  Deep venous thrombosis prophylaxis -aspirin 325, early mobilization  Continue ice and elevation to affected extremity  PT/OT AMPA 12  Pain Control: IV and PO, wean to orals as able  Monitor H&H  Social work consulted for discharge planning.   D/C Plan:  Home Health/[facility]

## 2023-01-24 NOTE — CARE COORDINATION
1/24/2023 social work transition of care planning  Sw followed up with guardian at bedside for kwasi choices:!)Maris, 2) g[facility]-not in network, [facility], and [facility]-referrals made. Electronically signed by STEPHANE Ambrosio on 1/24/2023 at 12:30 PM    Addendum: Sw notified that the [facility] declined. Awaiting [facility] and next choice [facility].   Electronically signed by STEPHANE Ambrosio on 1/24/2023 at 2:24 PM

## 2023-01-25 VITALS
RESPIRATION RATE: 16 BRPM | DIASTOLIC BLOOD PRESSURE: 60 MMHG | SYSTOLIC BLOOD PRESSURE: 115 MMHG | HEART RATE: 70 BPM | TEMPERATURE: 98.6 F | WEIGHT: 162 LBS | HEIGHT: 62 IN | OXYGEN SATURATION: 99 % | BODY MASS INDEX: 29.81 KG/M2

## 2023-01-25 PROBLEM — K21.9 GASTROESOPHAGEAL REFLUX DISEASE WITHOUT ESOPHAGITIS: Status: ACTIVE | Noted: 2023-01-25

## 2023-01-25 LAB — SARS-COV-2, NAAT: NOT DETECTED

## 2023-01-25 PROCEDURE — 6370000000 HC RX 637 (ALT 250 FOR IP): Performed by: ORTHOPAEDIC SURGERY

## 2023-01-25 PROCEDURE — 87635 SARS-COV-2 COVID-19 AMP PRB: CPT

## 2023-01-25 PROCEDURE — 97530 THERAPEUTIC ACTIVITIES: CPT

## 2023-01-25 PROCEDURE — 6370000000 HC RX 637 (ALT 250 FOR IP): Performed by: STUDENT IN AN ORGANIZED HEALTH CARE EDUCATION/TRAINING PROGRAM

## 2023-01-25 PROCEDURE — 97535 SELF CARE MNGMENT TRAINING: CPT

## 2023-01-25 PROCEDURE — 6370000000 HC RX 637 (ALT 250 FOR IP): Performed by: INTERNAL MEDICINE

## 2023-01-25 RX ORDER — METHOCARBAMOL 750 MG/1
750 TABLET, FILM COATED ORAL 4 TIMES DAILY PRN
Qty: 40 TABLET | Refills: 3 | DISCHARGE
Start: 2023-01-25

## 2023-01-25 RX ORDER — DOCUSATE SODIUM 100 MG/1
100 CAPSULE, LIQUID FILLED ORAL DAILY
Status: DISCONTINUED | OUTPATIENT
Start: 2023-01-25 | End: 2023-01-25 | Stop reason: HOSPADM

## 2023-01-25 RX ORDER — POLYETHYLENE GLYCOL 3350 17 G/17G
17 POWDER, FOR SOLUTION ORAL DAILY
Status: DISCONTINUED | OUTPATIENT
Start: 2023-01-25 | End: 2023-01-25 | Stop reason: HOSPADM

## 2023-01-25 RX ORDER — IBUPROFEN 600 MG/1
600 TABLET ORAL 4 TIMES DAILY PRN
Qty: 120 TABLET | Refills: 0 | DISCHARGE
Start: 2023-01-25

## 2023-01-25 RX ADMIN — OXYCODONE HYDROCHLORIDE 10 MG: 10 TABLET ORAL at 14:33

## 2023-01-25 RX ADMIN — ACETAMINOPHEN 650 MG: 325 TABLET ORAL at 09:28

## 2023-01-25 RX ADMIN — DOCUSATE SODIUM 100 MG: 100 CAPSULE, LIQUID FILLED ORAL at 17:22

## 2023-01-25 RX ADMIN — OXYCODONE HYDROCHLORIDE 10 MG: 10 TABLET ORAL at 02:07

## 2023-01-25 RX ADMIN — ASPIRIN 81 MG CHEWABLE TABLET 81 MG: 81 TABLET CHEWABLE at 09:28

## 2023-01-25 RX ADMIN — ACETAMINOPHEN 650 MG: 325 TABLET ORAL at 02:07

## 2023-01-25 RX ADMIN — PANTOPRAZOLE SODIUM 40 MG: 40 TABLET, DELAYED RELEASE ORAL at 06:26

## 2023-01-25 RX ADMIN — POLYETHYLENE GLYCOL 3350 34 G: 17 POWDER, FOR SOLUTION ORAL at 17:22

## 2023-01-25 RX ADMIN — OXYCODONE 5 MG: 5 TABLET ORAL at 06:26

## 2023-01-25 ASSESSMENT — PAIN DESCRIPTION - ONSET: ONSET: GRADUAL

## 2023-01-25 ASSESSMENT — PAIN DESCRIPTION - DESCRIPTORS
DESCRIPTORS: ACHING;DISCOMFORT;SORE
DESCRIPTORS: ACHING
DESCRIPTORS: PATIENT UNABLE TO DESCRIBE
DESCRIPTORS: ACHING;DISCOMFORT;SORE

## 2023-01-25 ASSESSMENT — PAIN DESCRIPTION - ORIENTATION
ORIENTATION: LEFT

## 2023-01-25 ASSESSMENT — PAIN DESCRIPTION - LOCATION
LOCATION: LEG;FOOT
LOCATION: LEG

## 2023-01-25 ASSESSMENT — PAIN DESCRIPTION - PAIN TYPE
TYPE: SURGICAL PAIN
TYPE: SURGICAL PAIN

## 2023-01-25 ASSESSMENT — PAIN SCALES - GENERAL
PAINLEVEL_OUTOF10: 7
PAINLEVEL_OUTOF10: 7
PAINLEVEL_OUTOF10: 6

## 2023-01-25 ASSESSMENT — PAIN - FUNCTIONAL ASSESSMENT
PAIN_FUNCTIONAL_ASSESSMENT: PREVENTS OR INTERFERES SOME ACTIVE ACTIVITIES AND ADLS

## 2023-01-25 ASSESSMENT — PAIN DESCRIPTION - FREQUENCY: FREQUENCY: INTERMITTENT

## 2023-01-25 NOTE — DISCHARGE INSTR - COC
Continuity of Care Form    Patient Name: Fabrice Pereira   :  1985  MRN:  [id number]    Admit date:  2023  Discharge date:  23    Code Status Order: Full Code   Advance Directives:     Admitting Physician:  Maurilio Huang MD  PCP: Queenie Yoon III, DO    [facility]/Room#: 5901/5666-D  Discharging Unit Phone Number: [phone]    Emergency Contact:   Extended Emergency Contact Information  Primary Emergency Contact: Nadya Fajardo  Address: [address], [address]  Mobile Phone: [phone]  Relation: Parent  Secondary Emergency Contact: [address]  Mobile Phone: [phone]  Relation: Other    Past Surgical History:  Past Surgical History:   Procedure Laterality Date    [facility]    5 months old     [address] Left 2023    left tibia open reduction with internal fixation performed by Maurilio Huang MD at [address] [place] [address]         Immunization History:   Immunization History   Administered Date(s) Administered    COVID-19, PFIZER Bivalent BOOSTER, DO NOT Dilute, (age 12y+), IM, 27 mcg/0.3 mL 10/15/2022    COVID-19, PFIZER PURPLE top, DILUTE for use, (age 15 y+), 30mcg/0.3mL 2021, 2021, 2021       Active Problems:  Patient Active Problem List   Diagnosis Code    Closed fracture of left fibula and tibia S82.202A, S82.402A    Closed displaced pilon fracture of right tibia S82.871A    Closed left pilon fracture, initial encounter S82.872A    Closed fracture of left tibia and fibula, initial encounter S82.202A, S82.402A       Isolation/Infection:   Isolation            No Isolation          Patient Infection Status       None to display            Nurse Assessment:  Last Vital Signs: BP (!) 114/56   Pulse 68   Temp 98.4 °F (36.9 °C) (Temporal)   Resp 16   Ht 5' 2\" (1.575 m)   Wt 162 lb (73.5 kg)   SpO2 98%   BMI 29.63 kg/m²     Last documented pain score (0-10 scale): Pain Level: 7  Last Weight:   Wt Readings from Last 1 Encounters:   01/21/23 162 lb (73.5 kg)     Mental Status:  alert and oriented MR    IV Access:  - None    Nursing Mobility/ADLs:  Walking   Assisted  Transfer  Assisted  Bathing  Assisted  Dressing  Assisted  Toileting  Assisted  Feeding  Independent  Med Admin  Assisted  Med Delivery   whole    Wound Care Documentation and Therapy:  Incision 01/21/23 Pretibial Left;Medial (Active)   Dressing Status Clean;Dry; Intact 01/25/23 0100   Incision Cleansed Hydrogen peroxide 01/21/23 1149   Dressing/Treatment Ace wrap;Splint 01/25/23 0100   Closure Sutures 01/21/23 1117   Margins Approximated 01/21/23 1117   Drainage Amount None 01/25/23 0100   Odor None 01/21/23 1117   Number of days: 3        Elimination:  Continence: Bowel: Yes  Bladder: incontinent/continent  Urinary Catheter: None   Colostomy/Ileostomy/Ileal Conduit: No       Date of Last BM: 1/19/23    Intake/Output Summary (Last 24 hours) at 1/25/2023 0805  Last data filed at 1/25/2023 2930  Gross per 24 hour   Intake 360 ml   Output --   Net 360 ml     I/O last 3 completed shifts: In: 360 [P.O.:360]  Out: -     Safety Concerns: At Risk for Falls    Impairments/Disabilities:      Speech and Language Barrier - hard to understand sometimes    Nutrition Therapy:  Current Nutrition Therapy:   - Oral Diet:  General    Routes of Feeding: Oral  Liquids: No Restrictions  Daily Fluid Restriction: no  Last Modified Barium Swallow with Video (Video Swallowing Test): not done    Treatments at the Time of Hospital Discharge:   Respiratory Treatments: n/a    Oxygen Therapy:  is not on home oxygen therapy.   Ventilator:    - No ventilator support    Rehab Therapies: Physical Therapy and Occupational Therapy  Weight Bearing Status/Restrictions: Non-weight bearing on left leg  Other Medical Equipment (for information only, NOT a DME order):  walker  Other Treatments: n/a      Patient's personal belongings (please select all that are sent with patient):  Glasses    RN SIGNATURE:  Electronically signed by Yolanda Becerra RN on 1/25/23 at 2:58 PM EST    CASE MANAGEMENT/SOCIAL WORK SECTION    Inpatient Status Date: 1/21/23    Readmission Risk Assessment Score:  Readmission Risk              Risk of Unplanned Readmission:  8           Discharging to Facility/ Agency   Name: [facility]  Address:  Phone:  Fax:    Dialysis Facility (if applicable)   Name:  Address:  Dialysis Schedule:  Phone:  Fax:    / signature: Electronically signed by STEPHANE Eric on 1/25/23 at 8:05 AM EST    PHYSICIAN SECTION    Prognosis: {Prognosis:1144577287}    Condition at Discharge: [address] Patient Condition:[id number]}    Rehab Potential (if transferring to Rehab): {Prognosis:0756041607}    Recommended Labs or Other Treatments After Discharge: follow up orthopedic office two weeks     Physician Certification: I certify the above information and transfer of María Rock  is necessary for the continuing treatment of the diagnosis listed and that she requires [place] for less 30 days.      Update Admission H&P: {CHP DME Changes in JFLWP:[id number]}    PHYSICIAN SIGNATURE:  {Esignature:[id number]}

## 2023-01-25 NOTE — PROGRESS NOTES
Department of Orthopedic Surgery  Resident Progress Note    Patient seen and examined. Pain controlled. Awaiting insurance approval for rehab discharge. No new complaints. Denies chest pain, shortness of breath, dizziness/lightheadedness. VITALS:  BP (!) 114/56   Pulse 68   Temp 98.4 °F (36.9 °C) (Temporal)   Resp 16   Ht 5' 2\" (1.575 m)   Wt 162 lb (73.5 kg)   SpO2 98%   BMI 29.63 kg/m²     General: alert and oriented to person, place and time, well-developed and well-nourished, in no acute distress    MUSCULOSKELETAL:   left lower extremity:  Dressing C/D/I  Compartments soft and compressible  + Plantar and dorsiflex his toes  +2/4 DP  pulse, Brisk Cap refill, Toes warm and perfused  Distal sensation grossly intact to Peroneals, Sural, Saphenous, and tibial nrs    CBC:   Lab Results   Component Value Date/Time    WBC 9.3 01/22/2023 08:14 AM    HGB 11.2 01/22/2023 08:14 AM    HCT 35.0 01/22/2023 08:14 AM     01/22/2023 08:14 AM     PT/INR:    Lab Results   Component Value Date/Time    PROTIME 12.4 01/21/2023 05:54 AM    INR 1.1 01/21/2023 05:54 AM         ASSESSMENT  S/P O reduction internal fixation of left tibial shaft fracture- 1/21/23    PLAN      Continue physical therapy and protocol: NWB - LLE  24 hour abx coverage completed  Deep venous thrombosis prophylaxis -aspirin 325, early mobilization  Continue ice and elevation to affected extremity  PT/OT AMPAC 12  Pain Control: IV and PO, wean to orals as able  Monitor H&H  D/C Plan:  Home Health/[facility]      Pain controlled. Awaiting rehab. We will see her back in the office 2 weeks postoperative.

## 2023-01-25 NOTE — CARE COORDINATION
1/25/2023 social work transition of care [facility]. Sw requested precert be started. Saúl will initiate [address] and place envelope in soft chart. Electronically signed by STEPHANE Curry on 1/25/2023 at 7:58 AM    Addendum: Sw notified that precert has been obtained,awaiting discharge(message sent to ortho). Will need covid test today(will take with any result)  Electronically signed by STEPHANE Curry on 1/25/2023 at 10:00 AM    Addendum; Saúl set up [facility] ambulance for 4:30 pm to [facility]. 7000 completed.   Electronically signed by STEPHANE Curry on 1/25/2023 at 10:40 AM

## 2023-01-25 NOTE — PROGRESS NOTES
Physical Therapy  Rx    Name: Fabrice Pereira  : 1985  MRN: [id number]      Date of Service: 2023    Evaluating PT:  Olinda Marshall, PT, DPT [id number]    Room #:  4933/9607-L  Diagnosis:  Closed fracture of left tibia and fibula, initial encounter [S82.202A, S82.402A]  Tibia/fibula fracture, shaft, left, closed, initial encounter [S82.202A, S82.402A]  Closed left pilon fracture, initial encounter [S82.872A]  PMHx/PSHx:   has a past medical history of Chest discomfort, Down syndrome, Heartburn, and History of open heart surgery. has a past surgical history that includes Cardiac surgery; Cholecystectomy; knee surgery; Tonsillectomy; Cardiac valuve replacement (); and Tibia fracture surgery (Left, 2023). Procedure/Surgery:  Open reduction internal fixation left tibial pilon fracture, tibia only with plate and screws (8027)  Precautions:  Falls, NWB LLE, Hx of Down Syndrome  Equipment Needs:  FWW; TBD  Equipment Owned:  Standard walker    SUBJECTIVE:    Pt lives with mother and sister in a 2 story home with 2 stair(s) to enter and 0 rail(s). Bed is on 1st floor and bath is on 1st floor. Pt ambulated with no AD PTA. OBJECTIVE:   Initial Evaluation  Date: 2023 Treatment  23 Short Term/ Long Term   Goals   AM-PAC 6 Clicks 75/95 46/57    Was pt agreeable to Eval/treatment? Yes yes    Does pt have pain? Mild LLE     Bed Mobility  Rolling: SBA  Supine to sit: Rosa  Sit to supine: Rosa  Scooting: Rosa (seated) Rolling: SBA  Supine to sit: Rosa  Sit to supine: nt  Scooting: Rosa (seated) Rolling: Supervision  Supine to sit: Supervision  Sit to supine: Supervision  Scooting: Supervision   Transfers Sit to stand: Rosa (bed); ModA x2 (chair)  Stand to sit: [address] [facility] (chair); MaxA x2 (bed)  Stand pivot: ModA with WW (bed > chair); MaxA x2 with [employer] (chair > bed) Sit to stand: Rosa (bed); min assist (chair)  Stand to sit: min a (chair);   Stand pivot:  mod with ww  more scooting on her non affected leg from bed to chair   Sit to stand: SBA  Stand to sit: SBA  Stand pivot: SBA with AAD   Ambulation    3 feet with [address] ModA (bed > chair); 3 feet with [address] MaxA x2 (chair > bed) Attempted to hop did a couple but pt not sure of her self and wanted to sit   Ww  mod    50 feet with AAD SBA   Stair negotiation: ascended and descended  NT NT 2 step(s) with 0 rail(s) + AAD SBA   ROM BUE:  See OT note  BLE:  WFL     Strength BUE:  See OT note  BLE:  WFL     Balance Sitting EOB:  SBA  Dynamic Standing:  ModA <> MaxA x2 with [address] Sitting eob sba/s  Dynamic standing mod to max with ww  Sitting EOB:  Supervision  Dynamic Standing:  SBA with AAD   Pt is A & O x  2  Edema:  na      Therapeutic Exercises: verbally educated mother in having pt do ankle pumps, laq on her right leg     Patient education  Pt educated on  how to utilize walker to hop    Patient response to education:   Pt verbalized understanding Pt demonstrated skill Pt requires further education in this area   x x x     ASSESSMENT:    Comments:  Pt on bed pan upon arrival.   Assisted pt with getting her underpants on . Pt sat eob and then pt stood maintaing nwb at  with mod assist.  Pt able to scoot  on her right leg and pivot to chair. Pt educated in hopping with ww and attempted she did a couple of hops and she was somewhat anxious and unsteady. Pt up sitting in chair with L LE elevated and mother present. Treatment:  Patient practiced and was instructed in the following treatment:    Bed mobility   Transfer - cues for hand placement          PLAN:    Patient is making fair  progress towards established goals. Will continue with current POC.       Time in  0845  Time out  0915    Total Treatment Time  30 minutes       CPT codes:  [x] Therapeutic activities 63047 30 minutes  [] Therapeutic exercises 80354 0 minutes      Laith Ochoa, [address]

## 2023-01-25 NOTE — PROGRESS NOTES
OCCUPATIONAL THERAPY TREATMENT NOTE     [facility]      OT BEDSIDE TREATMENT NOTE      Date:2023  Patient Name: Ailyn Feliz  MRN: [id number]  : 1985  Room: [address] [place], [place] [address]     Evaluating OT: Cong TRUONG, OTR/L, [id number]       Referring Provider: Alexander Nur DO    Specific Provider Orders/Date: OT eval and treat (23)    Diagnosis: Closed fracture of left tibia and fibula, initial encounter [S82.202A, S82.402A]  Tibia/fibula fracture, shaft, left, closed, initial encounter [S82.202A, S82.402A]  Closed left pilon fracture, initial encounter [N29.301E]    Surgeries/Procedures: : Open reduction internal fixation left tibial pilon fracture, tibia only with plate and screws       Pt admitted after tripping over a dog bone resulting in L tibial pilon fx. Pertinent Medical History:       has a past medical history of Chest discomfort, Down syndrome, Heartburn, and History of open heart surgery.         Precautions:  Fall Risk, NWB LLE, Down Syndrome      Assessment of current deficits    [x] Functional mobility            [x]ADLs           [x] Strength                   [x]Cognition    [x] Functional transfers          [x] IADLs          [x] Safety Awareness   [x]Endurance    [x] Fine Coordination              [x] Balance      [] Vision/perception    [x]Sensation      [x]Gross Motor Coordination  [x] ROM           [] Delirium                   [] Motor Control      OT PLAN OF CARE   OT POC based on physician orders, patient diagnosis and results of clinical assessment     Frequency/Duration 1-3 days/wk for 2 weeks PRN   Specific OT Treatment Interventions to include:   * Instruction/training on adapted ADL techniques and AE recommendations to increase functional independence within precautions       * Training on energy conservation strategies, correct breathing pattern and techniques to improve independence/tolerance for self-care routine  * Functional transfer/mobility training/DME recommendations for increased independence, safety, and fall prevention  * Patient/Family education to increase follow through with safety techniques and functional independence  * Recommendation of environmental modifications for increased safety with functional transfers/mobility and ADLs  * Cognitive retraining/development of therapeutic activities to improve problem solving, judgement, memory, and attention for increased safety/participation in ADL/IADL tasks  * Splinting/positioning for increased function, prevention of contractures, and improve skin integrity  * Therapeutic exercise to improve motor endurance, ROM, and functional strength for ADLs/functional transfers  * Therapeutic activities to facilitate/challenge dynamic balance, stand tolerance for increased safety and independence with ADLs  * Therapeutic activities to facilitate gross/fine motor skills for increased independence with ADLs  * Positioning to improve skin integrity, interaction with environment and functional independence     Recommended Adaptive Equipment/DME: Tub bench, TBD      Home Living: Pt lives with sisters and mother in 2 story home with 2 SUSAN and bed and bath on main level. Bathroom setup: tub/shower   Equipment owned: Standard walker     Prior Level of Function: Assist with ADLs , Assist with IADLs; used No AD for functional mobility. Driving: No  Occupation: None stated     Pain Level: Pt with no c/o pain this day  Cognition: A&O: ~2/4; Follows 1 step directions with re-direction.               Memory: F+              Sequencing: F              Problem solving: F              Judgement/safety: F                 Functional Assessment:  AM-PAC Daily Activity Raw Score: 15/24    Initial Eval Status  Date: 1/22/23 Treatment Status  Date: 1-25-23 STGs = LTGs  Time frame: 10-14 days   Feeding Set-up      set up  Independent    Grooming Stand by Assist   In supported sitting  Set up  To wash face and apply deodorant seated Modified Independent  seated   UB Dressing Min A  Seated at EOB      Min A  To don/doff socks seated Independent    LB Dressing Maximal Assist overall  SBA to don/doff R sock in long sit     Mod A  To don/doff socks and underwear seated and standing to pull over hips Minimal Assist    Bathing Maximal Assist     Mod A  Seated and standing for posterior Minimal Assist    Toileting Dependent      Max A- hygiene  Max A- clothing management Minimal Assist    Bed Mobility  Rolling: SBA  Supine to sit: Minimal Assist   Sit to supine: Minimal Assist    Per last tx Supine to sit: Independent   Sit to supine: Independent    Functional Transfers Sit to stand (bed):Minimal Assist  Sit to stand (chair): Mod A x2     Stand to sit: Minimal Assist  Stand to sit (chair): Mod A x2     Stand pivot (bed to chair): Moderate Assist     Stand pivot (chair to bed): Max A x2     Sit <> Stand: Max with ww    Cuing for hand placement and body mechanics Stand by Assist    Functional Mobility NT  Pt unable      N/T Stand by Assist with FWW   Balance Sitting:     Static: SBA    Dynamic: Min A  Standing: Mod A>Max A x2    during functional activity Sitting:     Static: ind    Dynamic: Min A  Standing: Max A Sitting:     Static: Ind    Dynamic: S  Standing: SBA   Activity Tolerance Fair with light activity  Fair WFL  For full ADL   Visual/  Perceptual Glasses: Yes          Safety F    Fair F+     Comments: Upon arrival pt seated in bedside chair. Pt educated on techniques to increase independence and safety during ADL's and functional transfers. At end of session pt. Seated up in chair with L LE elevated & ice applied, all needs met, all needs met, all lines and tubes intact, call light within reach. Pt has made F progress towards set goals.    Continue with current plan of care    Treatment Time In: 9:50            Treatment Time Out: 10:30                Treatment Charges: Mins Units   Ther Ex  [id number]     Manual Therapy [address] [facility] 10 1   ADL/Home Mgt 32090 30 2   Neuro Re-ed [address] [facility] manage/training  [address]     Non-Billable Time     Total Timed Treatment 40 [address]

## 2023-01-26 NOTE — ADT AUTH CERT
Utilization Reviews       Musculoskeletal Surgery or Procedure [facility] - Care Day 4 (1/24/2023) by Yudi Veliz RN       Review Status Review Entered   Completed 1/25/2023 1053       Created By   Yudi Veliz RN      Criteria Review      Care Day: 4 Care Date: 1/24/2023 Level of Care: Inpatient Floor    Guideline Day 3    Level Of Care    ( ) * Activity level acceptable    (X) Floor to discharge    1/25/2023 10:53 AM EST by Best Maguire    ( ) * Complete discharge planning    Clinical Status    (X) * Operative site and other wounds acceptable    1/25/2023 10:53 AM EST by Adolfo Parker      noted    (X) * Temperature status acceptable    1/25/2023 10:53 AM EST by Adolfo Parker      noted    (X) * No infection, or status acceptable    1/25/2023 10:53 AM EST by Adolfolucero Parker      noted    (X) * No blood loss, or problem resolved    1/25/2023 10:53 AM EST by Adolfo Parker      noted    ( ) * Pain and nausea absent or adequately managed    1/25/2023 10:53 AM EST by Adolfo Parker      8/10 pain    (X) * Vascular, soft tissue, and wound status acceptable    1/25/2023 10:53 AM EST by Adolfo Parker      noted    (X) * Fracture or injury absent or status acceptable    1/25/2023 10:53 AM EST by Adolfo Parker      noted    (X) * No spinal surgery, or status acceptable    1/25/2023 10:53 AM EST by Adolfo Parker      noted    ( ) * General Discharge Criteria met    1/25/2023 10:53 AM EST by Lyndsey johnson    Interventions    (X) * Intake acceptable    1/25/2023 10:53 AM EST by Adolfo Parker      %    ( ) * No inpatient interventions needed    1/25/2023 10:53 AM EST by Adolfo Parker      see notes    * Milestone   Additional Notes   DATE: 1/24 admit [facility] inpt  from observation         RELEVANT BASELINES: (lab values, vitals, o2 amount/delivery, etc.)   RA      PERTINENT UPDATES:   8/10 pain      VITALS:    98.1 (36.7) - 69 117/65 - High fowlers 98RA:         PHYSICAL EXAM:   MUSCULOSKELETAL: left lower extremity:   · Dressing C/D/I   · Compartments soft and compressible   · + Plantar and dorsiflex his toes   · +2/4 DP  pulse, Brisk Cap refill, Toes warm and perfused   · Distal sensation grossly intact to Peroneals, Sural, Saphenous, and tibial nrs      MD CONSULTS/ASSESSMENT AND PLAN:   ASSESSMENT   · S/P O reduction internal fixation of left tibial shaft fracture- 1/21/23       PLAN       · Continue physical therapy and protocol: NWB - LLE   · 24 hour abx coverage completed   · Deep venous thrombosis prophylaxis -aspirin 325, early mobilization   · Continue ice and elevation to affected extremity   · PT/OT [facility] 12   · Pain Control: IV and PO, wean to orals as able   · Monitor H&H   · Social work consulted for discharge planning.    · D/C Plan:  Home Health/[facility]      MEDICATIONS:   Tylenol 650 mg qid   Asa 81 mg qd   Oxy ir prn x4   Maalox 5ml prn x1   Zofran po prn x1      ORDERS:   Admit gmf from obs   Pt ot   Reg diet      PT/OT/SLP/CM ASSESSMENT OR NOTES:   Only transfer to bedside chair            Musculoskeletal Surgery or Procedure [facility] - Care Day 3 (1/23/2023) by Robbie Mayberry RN       Review Status Review Entered   Completed 1/25/2023 1040       Created By   Robbie Mayberry RN      Criteria Review      Care Day: 3 Care Date: 1/23/2023 Level of Care: Inpatient Floor    Guideline Day 3    Level Of Care    ( ) * Activity level acceptable    1/25/2023 10:40 AM EST by Mariah Jarrett      1/22 pt note   3 feet with [employer] ModA (bed > chair); 3 feet with [employer] MaxA x2 (chair > bed    (X) Floor to discharge    1/25/2023 10:40 AM EST by Adelaide Mckeon    ( ) * Complete discharge planning    Clinical Status    (X) * Operative site and other wounds acceptable    1/25/2023 10:40 AM EST by Mariah Jarrett      noted    (X) * Temperature status acceptable    1/25/2023 10:40 AM EST by Mariah Jarrett      noted    (X) * No infection, or status acceptable    1/25/2023 10:40 AM EST by Mariah Jarrett      noted (X) * No blood loss, or problem resolved    1/25/2023 10:40 AM EST by Bettylou Severance      noted    ( ) * Pain and nausea absent or adequately managed    1/25/2023 10:40 AM EST by Bettylou Severance      7/10 pain    (X) * Vascular, soft tissue, and wound status acceptable    1/25/2023 10:40 AM EST by Bettylou Severance      noted    (X) * Fracture or injury absent or status acceptable    1/25/2023 10:40 AM EST by Bettylou Severance      noted    (X) * No spinal surgery, or status acceptable    1/25/2023 10:40 AM EST by Bettylou Severance      noted    ( ) * General Discharge Criteria met    1/25/2023 10:40 AM EST by Bettylou Severance      NA yet    Interventions    (X) * Intake acceptable    1/25/2023 10:40 AM EST by Bettylou Severance      %    ( ) * No inpatient interventions needed    1/25/2023 10:40 AM EST by Bettylou Severance      see notes    * Milestone   Additional Notes   DATE: 1/23 cd 3 gmf         PERTINENT UPDATES:   7/10 pain      VITALS:   97.2 (36.2) 18 80 136/81 - - - - 99 None (Room air)          PHYSICAL EXAM:   MUSCULOSKELETAL:    left lower extremity:   · Dressing C/D/I   · Compartments soft and compressible   · + Plantar and dorsiflex his toes   · +2/4 DP  pulse, Brisk Cap refill, Toes warm and perfused   · Distal sensation grossly intact to Peroneals, Sural, Saphenous, and tibial nrs      ASSESSMENT   · S/P O reduction internal fixation of left tibial shaft fracture- 1/21/23       PLAN       · Continue physical therapy and protocol: NWB - LLE   · 24 hour abx coverage completed   · Deep venous thrombosis prophylaxis -aspirin, early mobilization   · Continue ice and elevation to affected extremity   · PT/OT [facility] 12   · Pain Control: IV and PO, wean to orals as able   · Monitor H&H   · Social work consulted for discharge planning   · D/C Plan:  Home Health          MEDICATIONS:   Tylenol 650 mg qid   Asa 81 mg qd   Oxy ir prn x3      ORDERS:   Obs gmf   Pt ot   Reg diet

## 2023-01-27 NOTE — DISCHARGE SUMMARY
Physician Discharge Summary    Patient ID:  Rafael Bustillos  [id number]  11 y.o.  1985    Admit date: 1/21/2023    Discharge date and time: 1/25/2023  9:10 PM     Admitting Physician: Charlene Mullins MD     Discharge Physician: Charlene Mullins MD    Admission Diagnoses: Closed fracture of left tibia and fibula, initial encounter [S82.202A, S82.402A]  Tibia/fibula fracture, shaft, left, closed, initial encounter [S82.202A, S82.402A]  Closed left pilon fracture, initial encounter [S82.872A]    Discharge Diagnoses: s/p Open reduction internal fixation left tibial pilon fracture, tibia only with plate and screws    Condition at Discharge: Stable    Post Operative Course: The patient was admitted on 1/21/2023. The patient underwent an uneventful course of Open reduction internal fixation left tibial pilon fracture, tibia only with plate and screwsby Charlene Mullins MD on 1/21/23. The patient was subsequently taken to the PACU and the floor in stable condition. The patient was started on pain control, DVT prophylaxis, antibiotics, and physical therapy as indicated. Blood counts were followed as needed. Discharge planning was performed and tailored in regards to patient progress, therapy progress, home needs, and support.  Once the patient had made appropriate gains, they were able to be discharged    Treatments: s/p Open reduction internal fixation left tibial pilon fracture, tibia only with plate and screws    Discharge Exam:   VITALS:  BP (!) 114/56   Pulse 68   Temp 98.4 °F (36.9 °C) (Temporal)   Resp 16   Ht 5' 2\" (1.575 m)   Wt 162 lb (73.5 kg)   SpO2 98%   BMI 29.63 kg/m²      General: alert and oriented to person, place and time, well-developed and well-nourished, in no acute distress     MUSCULOSKELETAL:   left lower extremity:  Dressing C/D/I  Compartments soft and compressible  + Plantar and dorsiflex his toes  +2/4 DP  pulse, Brisk Cap refill, Toes warm and perfused  Distal sensation grossly intact to Peroneals, Sural, Saphenous, and tibial nrs    Disposition: Patient was discharged to Subacute Rehab. The patient was provided instructions to continue  pain medication, DVT prophylaxis, Dressing changes as applicable. The patient was instructed on restrictions and activities. The patient was to follow up with Charlene Mullins MD, and appointment was scheduled prior to the patient's discharge from the hospital.       Medication List        START taking these medications      aspirin EC 81 MG EC tablet  Take 1 tablet by mouth 2 times daily for 28 days     ibuprofen 600 MG tablet  Commonly known as: ADVIL;MOTRIN  Take 1 tablet by mouth 4 times daily as needed for Pain     methocarbamol 750 MG tablet  Commonly known as: Robaxin-750  Take 1 tablet by mouth 4 times daily as needed (Muscle spasm/pain) Recommended to take between doses of narcotic pain meds     oxyCODONE-acetaminophen 5-325 MG per tablet  Commonly known as: Percocet  Take 1 tablet by mouth every 6 hours as needed for Pain for up to 7 days. Intended supply: 7 days.  Take lowest dose possible to manage pain Max Daily Amount: 4 tablets            CONTINUE taking these medications      Dexilant 60 MG Cpdr delayed release capsule  Generic drug: dexlansoprazole               Where to Get Your Medications        These medications were sent to [facility], [address] [facility] [phone] [facility] [phone]  [address] [address]      Phone: [phone]   aspirin EC 81 MG EC tablet  oxyCODONE-acetaminophen 5-325 MG per tablet       Information about where to get these medications is not yet available    Ask your nurse or doctor about these medications  ibuprofen 600 MG tablet  methocarbamol 750 MG tablet         Future Appointments   Date Time Provider Francisco Randhawa   2/8/2023 10:45 AM Charlene Mullins MD [facility]        Signed:  Electronically signed by Alyson Murray PA-C on 1/27/2023 at 9:17 AM

## 2023-01-28 ENCOUNTER — OUTSIDE SERVICES (OUTPATIENT)
Dept: PRIMARY CARE CLINIC | Age: 38
End: 2023-01-28

## 2023-01-28 DIAGNOSIS — K21.9 GASTROESOPHAGEAL REFLUX DISEASE WITHOUT ESOPHAGITIS: ICD-10-CM

## 2023-01-28 DIAGNOSIS — M46.1 SACROILIITIS (HCC): ICD-10-CM

## 2023-01-28 DIAGNOSIS — M54.50 ACUTE RIGHT-SIDED LOW BACK PAIN WITHOUT SCIATICA: ICD-10-CM

## 2023-01-28 DIAGNOSIS — S82.402A CLOSED FRACTURE OF LEFT TIBIA AND FIBULA, INITIAL ENCOUNTER: Primary | ICD-10-CM

## 2023-01-28 DIAGNOSIS — Q90.9 DOWN SYNDROME: ICD-10-CM

## 2023-01-28 DIAGNOSIS — S82.202A CLOSED FRACTURE OF LEFT TIBIA AND FIBULA, INITIAL ENCOUNTER: Primary | ICD-10-CM

## 2023-01-28 ASSESSMENT — ENCOUNTER SYMPTOMS
BACK PAIN: 1
SHORTNESS OF BREATH: 0
ABDOMINAL PAIN: 0
SORE THROAT: 0
VOMITING: 0
WHEEZING: 0
VOICE CHANGE: 0
CHEST TIGHTNESS: 0
NAUSEA: 0

## 2023-01-29 NOTE — PROGRESS NOTES
H&P  23  Holli Greenfield : 1985 Sex: female  Age: 30 y.o.      HPI: Patient states she sustained a fall at home and denies any loss of consciousness she had a fracture left tibia required surgery with hardware 2023 patient is being admitted to the rehab for physical therapy and strengthening. Patient is complaining of acid reflux and also is complaining of pain in the right lower back. Review of Systems   Constitutional:  Negative for chills, fever and unexpected weight change. HENT:  Negative for postnasal drip, sore throat and voice change. Respiratory:  Negative for chest tightness, shortness of breath and wheezing. Cardiovascular:  Negative for chest pain and leg swelling. Gastrointestinal:  Negative for abdominal pain, nausea and vomiting. Musculoskeletal:  Positive for back pain. Negative for gait problem and joint swelling. Skin:  Negative for rash and wound. Neurological: Negative. Psychiatric/Behavioral: Negative. Current Outpatient Medications:     methocarbamol (ROBAXIN-750) 750 MG tablet, Take 1 tablet by mouth 4 times daily as needed (Muscle spasm/pain) Recommended to take between doses of narcotic pain meds, Disp: 40 tablet, Rfl: 3    ibuprofen (ADVIL;MOTRIN) 600 MG tablet, Take 1 tablet by mouth 4 times daily as needed for Pain, Disp: 120 tablet, Rfl: 0    aspirin EC 81 MG EC tablet, Take 1 tablet by mouth 2 times daily for 28 days, Disp: 56 tablet, Rfl: 0    oxyCODONE-acetaminophen (PERCOCET) 5-325 MG per tablet, Take 1 tablet by mouth every 6 hours as needed for Pain for up to 7 days. Intended supply: 7 days. Take lowest dose possible to manage pain Max Daily Amount: 4 tablets, Disp: 28 tablet, Rfl: 0    DEXILANT 60 MG CPDR capsule, 60 mg daily, Disp: , Rfl: 1     There were no vitals filed for this visit. Physical Exam  Constitutional:       Appearance: She is normal weight.    HENT:      Head: Normocephalic and atraumatic. Right Ear: External ear normal.      Left Ear: External ear normal.      Nose: No rhinorrhea. Mouth/Throat:      Mouth: Mucous membranes are moist.   Eyes:      General: No scleral icterus. Right eye: No discharge. Left eye: No discharge. Cardiovascular:      Rate and Rhythm: Normal rate and regular rhythm. Heart sounds: Normal heart sounds. No murmur heard. No gallop. Pulmonary:      Effort: Pulmonary effort is normal.      Breath sounds: No wheezing or rales. Abdominal:      General: Bowel sounds are normal.      Palpations: Abdomen is soft. There is no mass. Tenderness: There is abdominal tenderness (Epigastric area). There is no guarding or rebound. Musculoskeletal:         General: Tenderness (Tenderness with palpation of the right sacroiliac joint) present. No swelling. Normal range of motion. Right lower leg: No edema. Left lower leg: No edema. Skin:     General: Skin is warm and dry. Neurological:      General: No focal deficit present. Mental Status: She is alert. Comments: Pleasant and communicating. Psychiatric:         Mood and Affect: Mood normal.         Behavior: Behavior normal.         Thought Content: Thought content normal.         Judgment: Judgment normal.       Assessment and Plan:  1. Closed fracture of left tibia and fibula, initial encounter  Comments:  Patient had surgery fixation of the left tibia with hardware January 21, 2023. Will require rehab, PT and OT evaluation and treatment  2. Gastroesophageal reflux disease without esophagitis  Comments: The pt. will require Pepcid in addition to [address]. Patient to take Pepcid every night and Dexilant every morning. 3. Acute right-sided low back pain without sciatica  4. Down syndrome       Discussions/Education provided to patients during visit:  [] Discussed the importance to stop smoking. [] Advised to monitor eating habits.   [] Reviewed and discussed Imaging results. [] Reviewed and discussed Lab results. [] Discussed the importance of drinking plenty of fluids. [] Cut down on Salt, Caffeine, and Sugar. [x] Continue Medications as Discussed. [x] Communicated with patient any concerns, to phone office. Orders written: Hospital records and surgical notes reviewed  Addressed epigastric pain with adding Pepcid and continue Dexilant  Lidocaine patch to sacroiliac area and monitor patient's symptoms. Pain medication reviewed and prescription sent to the pharmacy. No follow-ups on file.       Seen By:  Shelbi Paiz MD

## 2023-02-03 DIAGNOSIS — S82.202A CLOSED FRACTURE OF LEFT TIBIA AND FIBULA, INITIAL ENCOUNTER: ICD-10-CM

## 2023-02-03 DIAGNOSIS — S82.872A CLOSED LEFT PILON FRACTURE, INITIAL ENCOUNTER: Primary | ICD-10-CM

## 2023-02-03 DIAGNOSIS — S82.402A CLOSED FRACTURE OF LEFT TIBIA AND FIBULA, INITIAL ENCOUNTER: ICD-10-CM

## 2023-02-08 ENCOUNTER — OFFICE VISIT (OUTPATIENT)
Dept: ORTHOPEDIC SURGERY | Age: 38
End: 2023-02-08
Payer: MEDICARE

## 2023-02-08 ENCOUNTER — HOSPITAL ENCOUNTER (OUTPATIENT)
Dept: GENERAL RADIOLOGY | Age: 38
Discharge: HOME | End: 2023-02-10
Payer: MEDICARE

## 2023-02-08 VITALS — WEIGHT: 165 LBS | HEIGHT: 60 IN | BODY MASS INDEX: 32.39 KG/M2

## 2023-02-08 DIAGNOSIS — S82.872A CLOSED LEFT PILON FRACTURE, INITIAL ENCOUNTER: ICD-10-CM

## 2023-02-08 DIAGNOSIS — S82.202A CLOSED FRACTURE OF LEFT TIBIA AND FIBULA, INITIAL ENCOUNTER: ICD-10-CM

## 2023-02-08 DIAGNOSIS — S92.355D CLOSED NONDISPLACED FRACTURE OF FIFTH METATARSAL BONE OF LEFT FOOT WITH ROUTINE HEALING, SUBSEQUENT ENCOUNTER: Primary | ICD-10-CM

## 2023-02-08 DIAGNOSIS — S82.402A CLOSED FRACTURE OF LEFT TIBIA AND FIBULA, INITIAL ENCOUNTER: ICD-10-CM

## 2023-02-08 PROCEDURE — 99213 OFFICE O/P EST LOW 20 MIN: CPT | Performed by: PHYSICIAN ASSISTANT

## 2023-02-08 PROCEDURE — 29405 APPL SHORT LEG CAST: CPT | Performed by: PHYSICIAN ASSISTANT

## 2023-02-08 PROCEDURE — 73610 X-RAY EXAM OF ANKLE: CPT

## 2023-02-08 PROCEDURE — 73590 X-RAY EXAM OF LOWER LEG: CPT

## 2023-02-08 RX ORDER — LIDOCAINE 50 MG/G
1 PATCH TOPICAL DAILY
COMMUNITY

## 2023-02-08 RX ORDER — FAMOTIDINE 10 MG
20 TABLET ORAL NIGHTLY PRN
COMMUNITY

## 2023-02-08 RX ORDER — OXYCODONE HYDROCHLORIDE AND ACETAMINOPHEN 5; 325 MG/1; MG/1
1 TABLET ORAL EVERY 6 HOURS PRN
COMMUNITY

## 2023-02-08 NOTE — PATIENT INSTRUCTIONS
INSTRUCTIONS FOR FACILITY      Weight Bearing: Non-weight bearing left lower extremity. Use assistive devices when needed. Therapy: Continue PT/OT. Gait and transfers with assistive devices, UE strengthening, R knee and hip strengthening and ROM. Pain control: per facility physician, frequent ice and elevation when needed. Incisional Care: sutures removed today in office. Steri strips placed. Cast: Short leg cast applied today. Call the office if having any complications with the cast (too tight or loose, etc). This cast cannot get wet. If the cast becomes wet or damaged, call the office. DVT Prophylaxis: Continue with Aspirin 81mg BID. Follow up: 4 weeks, see below. Future Appointments   Date Time Provider Francisco Randhawa   3/8/2023  9:30 AM Roseanne Marquez MD [facility]         Call with any questions or concerns. [phone]      Wearing a Cast: Care Instructions  Your Care Instructions     A cast protects a broken bone or other injury while it heals. Your cast is made of plaster. After a cast is put on, you can't remove it yourself. Your doctor or a technician will take it off. Follow-up care is a key part of your treatment and safety. Be sure to make and go to all appointments, and call your doctor if you are having problems. It's also a good idea to know your test results and keep a list of the medicines you take. How can you care for yourself at home? General care  Follow your doctor's instructions for when you can start using the limb that has the cast. Plaster casts may take several days before they are hard enough to protect the injured limb. When it's okay to put weight on your leg or foot cast, don't stand or walk on it unless it's designed for walking. Prop up the injured arm or leg on a pillow anytime you sit or lie down during the first 3 days. Try to keep it above the level of your heart. This will help reduce swelling.   Put ice or a cold pack on your cast for 10 to 20 minutes at a time. Try to do this every 1 to 2 hours for the next 3 days (when you are awake). Put a thin cloth between the ice and your cast. Keep the cast dry. Be safe with medicines. Read and follow all instructions on the label. If the doctor gave you a prescription medicine for pain, take it as prescribed. If you are not taking a prescription pain medicine, ask your doctor if you can take an over-the-counter medicine. Do exercises as instructed by your doctor or physical therapist. These exercises will help keep your muscles strong and your joints flexible while you heal.  Wiggle your fingers or toes on the injured arm or leg often. This helps reduce swelling and stiffness. Water and your cast  Keep your cast completely dry. The plaster will start to break down if it gets wet. Use a bag or tape a sheet of plastic to cover your cast when you take a shower or bath or when you have any other contact with water. (Don't take a bath unless you can keep the cast out of the water.) Moisture can collect under the cast and cause skin irritation and itching. It can make infection more likely if you had surgery or have a wound under the cast.  Cast and skin care  Try blowing cool air from a hair dryer or fan into the cast to help relieve itching. Never stick items under your cast to scratch the skin. Don't use oils or lotions near your cast. If the skin gets red or irritated around the edge of the cast, you may pad the edges with a soft material or use tape to cover them. When should you call for help? Call your doctor now or seek immediate medical care if:    You have increased or severe pain. You feel a warm or painful spot under the cast.     You have problems with your cast. For example: The skin under the cast burns or stings. The cast feels too tight or too loose. There is a lot of swelling near the cast. (Some swelling is normal.)  You have a new fever.   There is drainage or a bad smell coming from the cast.     Your foot or hand is cool or pale or changes color. You have trouble moving your fingers or toes. You have worsening pain not controllable by medications, ice, and elevation     You have symptoms of a blood clot in your arm or leg (called a deep vein thrombosis). These may include:  Pain in the arm, calf, back of the knee, thigh, or groin. Redness and swelling in the arm, leg, or groin. Watch closely for changes in your health, and be sure to contact your doctor if:    The cast is breaking apart. You are not getting better as expected. Where can you learn more? Go to [email or web address] [email or web address] org and sign in to your [employer] account. Enter P140 in the [employer] box to learn more about \"Wearing a Plaster Cast: Care Instructions. \"     If you do not have an account, please click on the \"Sign Up Now\" link. Current as of: March 2, 2020               Content Version: 12.6  © 2006-2020 [employer]. Care instructions adapted under license by [facility] ([facility]). If you have questions about a medical condition or this instruction, always ask your healthcare professional. [place] [address] any warranty or liability for your use of this information. After Fracture Nutrition Recommendations:  After a fracture, your bone needs to rebuild. A healthy, well-balanced diet rich in key nutrients can help speed that up. You don't need to take supplements unless your doctor recommends it. They don't always work well. It's much better to get the nutrition you need from your plate, not from a pill. Protein  About half your bone's structure is made of this. When you have a fracture, your body needs it to build new bone for the repair. It also helps your body take in and use calcium, another key nutrient for healthy bones.     Good sources: Meat, fish, milk, cheese, cottage cheese, yogurt, nuts, seeds, beans, soy products, and fortified cereals. Calcium  This mineral also helps you build strong bones, so foods and drinks rich in it can help your bone fracture heal. Adults should get between 1,000 and 1,200 milligrams of calcium each day. Your doctor will tell you if you need a calcium supplement, and what amount you should take if you do. Good sources: Milk, yogurt, cheese, cottage cheese, broccoli, turnip or rosalie greens, kale, bok rafaela, soy, beans, canned tuna or salmon with bones, almond milk, and fortified cereals or juice. Vitamin D  This vitamin should be a part of your diet to help your fracture heal. It helps your blood take in and use calcium and build up the minerals in your bones. You get some vitamin D when sunlight hits your skin, so it can be a good idea to spend a short amount of time outdoors each day -- 15 minutes may be enough for a fair-skinned person. Vitamin D is found naturally in only a few foods like egg yolks and fatty fish, but manufacturers add it to other foods, like milk or orange juice. Adults should get at least 600 IU of vitamin D every day, and if you're over 70 you should get at least 800 IU. Good sources: Swordfish, salmon, cod liver oil, sardines, liver, fortified milk or yogurt, egg yolks, and fortified orange juice. Vitamin C  Collagen is a protein that's an important building block for bone. Vitamin C helps your body make collagen, which helps your bone fracture heal. You can get it from many tasty, fresh fruits and veggies. Aged or heated produce can lose some of its vitamin C, so go for fresh or frozen. Good sources: Citrus fruits like oranges, kiwi fruit, berries, tomatoes, peppers, potatoes, and green vegetables. Iron  If you have iron-deficiency anemia -- when you don't have enough healthy red blood cells -- you may heal more slowly after a fracture. Iron helps your body make collagen to rebuild bone.  It also plays a part in getting oxygen into your bones to help them heal.    Good sources: Red meat, dark-meat chicken or turkey, oily fish, eggs, dried fruits, leafy green veggies, whole-grain breads, and fortified cereals. Potassium  Get enough of this mineral in your diet, and you won't lose as much calcium when you pee. There are lots of fresh fruits rich in potassium. Good sources: Bananas, orange juice, potatoes, nuts, seeds, fish, meat, and milk. What Not to Eat  It's a good idea to cut back on or skip these:    Alcohol: While you don't have to cut out alcoholic drinks, these beverages slow down bone healing. You won't build new bone as fast to fix the fracture. A bit too much alcohol can also make you unsteady on your feet, which can make you more likely to fall and risk an injury to the same bone. Salt: Too much of this in your diet can make you lose more calcium in your urine. Salt can be in some foods or drinks that don't taste salty, so check labels and aim for about 1 teaspoon, or 6 grams, a day. Coffee: Lots of caffeine -- more than four cups of strong coffee a day -- can slow down bone healing a little. It might make you pee more, and that could mean you lose more calcium through your urine. A moderate amount of coffee or tea should be fine.             Future Appointments   Date Time Provider Francisco Randhawa   3/8/2023  9:30 AM Evy Powell MD [facility]

## 2023-02-08 NOTE — PROGRESS NOTES
Chief Complaint   Patient presents with    Post-Op Check     L tibia ORIF 1/21/2023. Residing at [address]. Having pain. Incision is well approximated and healing well, sutures intact with no s/s of infection or dehiscence. OP:SURGEON: Dr. Lety Masterson MD  DATE OF PROCEDURE: 1/21/23  PROCEDURE:Open reduction internal fixation left tibial pilon fracture, tibia only with plate and screws       Subjective:  Aravind Noriega is approximately 2.5 weeks follow-up from the above surgery. She is in rehab facility currently. Has maintained splint and NWB L LE without any significant post-op complications. Pain is mild at the distal leg and foot. She also has a non-operative 5th metatarsal fracture, closed and nondisplaced. She is receiving PT and OT per SNF paperwork. Taking ASA 81mg BID for DVT prophylaxis. Somewhat poor historian, but here today with family. No new concerns. Denies calf pain, CP, SOB, fever, chills, malaise. Review of Systems -  all pertinent positives and negatives in HPI. Objective:    General: Alert and oriented X 3, normocephalic atraumatic, external ears and eye normal, sclera clear, no acute distress, respirations easy and unlabored with no audible wheezes, skin warm and dry, speech and dress appropriate for noted age, affect euthymic. Extremity:  Left Lower Extremity  Skin clean dry and intact, without signs of infection  Incisions well approximated without signs of redness, warmth or drainage- sutures intact  Moderate foot and ankle edema  Moderate TTP at the distal tibia, tibiotalar joint at the ankle, and along the 5th ray of the foot  Compartments supple throughout thigh and leg  Calf supple and nontender  Demonstrates active knee flexion/extension, (minimal) ankle plantar/dorsiflexion/great toe extension. States sensation intact to touch in sural/deep peroneal/superficial peroneal/saphenous/posterior tibial nerve distributions to foot/ankle.    Palpable dorsalis pedis and posterior tibialis pulses, cap refill brisk in toes, foot warm/perfused. Ht 5' (1.524 m)   Wt 165 lb (74.8 kg)   BMI 32.22 kg/m²     XR:   Multiple views of L tib fib and L ankle demonstrating s/p ORIF for L tibial pilon fracture. Hardware remains intact without interval displacement, loosening, or failure. No significant change in alignment. No acute fractures or dislocations or any other osseus abnormality identified. Assessment:   Diagnosis Orders   1. Closed nondisplaced fracture of fifth metatarsal bone of left foot with routine healing, subsequent encounter  XR FOOT LEFT (MIN 3 VIEWS)      2. Closed left pilon fracture, initial encounter  XR TIBIA FIBULA LEFT (2 VIEWS)    XR ANKLE LEFT (MIN 3 VIEWS)          Plan:  Reviewed x-rays with patient today in office     Weight Bearing: Non-weight bearing left lower extremity. Use assistive devices when needed. Therapy: Continue PT/OT. Gait and transfers with assistive devices, UE strengthening, R knee and hip strengthening and ROM. Pain control: per facility physician, frequent ice and elevation when needed. Incisional Care: sutures removed today in office. Steri strips placed. Cast: Short leg cast applied today. Call the office if having any complications with the cast (too tight or loose, etc). This cast cannot get wet. If the cast becomes wet or damaged, call the office. DVT Prophylaxis: Continue with Aspirin 81mg BID. Follow up: 4 weeks, see below. Future Appointments   Date Time Provider Francisco Randhawa   3/8/2023  9:30 AM Angelika Erwin MD [facility]       Call with any questions or concerns. [phone]        Electronically signed by Neelam Majano PA-C on 2/8/2023 at 11:48 AM  Note: This report was completed using [employer] voiced recognition software. Every effort has been made to ensure accuracy; however, inadvertent computerized transcription errors may be present.

## 2023-02-15 DIAGNOSIS — S82.202A CLOSED FRACTURE OF LEFT TIBIA AND FIBULA, INITIAL ENCOUNTER: ICD-10-CM

## 2023-02-15 DIAGNOSIS — S82.402A CLOSED FRACTURE OF LEFT TIBIA AND FIBULA, INITIAL ENCOUNTER: ICD-10-CM

## 2023-02-15 RX ORDER — OXYCODONE HYDROCHLORIDE AND ACETAMINOPHEN 5; 325 MG/1; MG/1
1 TABLET ORAL EVERY 8 HOURS PRN
Qty: 21 TABLET | Refills: 0 | Status: SHIPPED | OUTPATIENT
Start: 2023-02-15 | End: 2023-02-22

## 2023-02-15 RX ORDER — METHOCARBAMOL 750 MG/1
750 TABLET, FILM COATED ORAL 4 TIMES DAILY PRN
Qty: 40 TABLET | Refills: 3 | Status: SHIPPED | OUTPATIENT
Start: 2023-02-15

## 2023-02-15 RX ORDER — IBUPROFEN 600 MG/1
600 TABLET ORAL 4 TIMES DAILY PRN
Qty: 120 TABLET | Refills: 0 | Status: SHIPPED | OUTPATIENT
Start: 2023-02-15

## 2023-02-15 NOTE — TELEPHONE ENCOUNTER
Received call from patient's sister, Usha Vogt, requesting refill of Percocet, Ibuprofen 600 mg and Robaxin 750 mg. Call placed to [address], per  patient was discharged home on 2/12/2023. Date of Procedure: 1/21/2023  Procedure:  Open reduction internal fixation left tibial pilon fracture, tibia only with plate and screws  Surgeon(s):  Chaya Haji MD    Last OV: 2/8/2023    Medication pended and routed to providers for decision and signature.     Future Appointments   Date Time Provider Francisco Randhawa   3/8/2023  9:30 AM Chaya Haji MD [facility]

## 2023-02-15 NOTE — TELEPHONE ENCOUNTER
Controlled Substance Monitoring:    Acute and Chronic Pain Monitoring:   RX Monitoring 2/15/2023   Periodic Controlled Substance Monitoring No signs of potential drug abuse or diversion identified.

## 2023-02-22 DIAGNOSIS — S82.202A CLOSED FRACTURE OF LEFT TIBIA AND FIBULA, INITIAL ENCOUNTER: ICD-10-CM

## 2023-02-22 DIAGNOSIS — S82.402A CLOSED FRACTURE OF LEFT TIBIA AND FIBULA, INITIAL ENCOUNTER: ICD-10-CM

## 2023-02-22 RX ORDER — OXYCODONE HYDROCHLORIDE AND ACETAMINOPHEN 5; 325 MG/1; MG/1
1 TABLET ORAL 2 TIMES DAILY PRN
Qty: 14 TABLET | Refills: 0 | Status: SHIPPED | OUTPATIENT
Start: 2023-02-22 | End: 2023-03-01

## 2023-02-22 RX ORDER — FAMOTIDINE 10 MG
20 TABLET ORAL NIGHTLY PRN
Qty: 60 TABLET | Refills: 1 | Status: SHIPPED | OUTPATIENT
Start: 2023-02-22

## 2023-02-22 NOTE — TELEPHONE ENCOUNTER
Pepcid refilled. Percocet refilled and weaned to BID PRN      Controlled Substance Monitoring:    Acute and Chronic Pain Monitoring:   RX Monitoring 2/22/2023   Periodic Controlled Substance Monitoring No signs of potential drug abuse or diversion identified.

## 2023-02-22 NOTE — TELEPHONE ENCOUNTER
Received call from patient's sister, Dilcia Horvath, requesting refill of Percocet and Pepcid. Date of Procedure: 1/21/2023  Procedure:  Open reduction internal fixation left tibial pilon fracture, tibia only with plate and screws  Surgeon(s):  Christell Romberg, MD     Last OV: 2/8/2023     Medication pended and routed to providers for decision and signature.             Future Appointments   Date Time Provider Francisco Randhawa   3/8/2023  9:30 AM Christell Romberg, MD [facility]

## 2023-03-06 DIAGNOSIS — S82.202A CLOSED FRACTURE OF LEFT TIBIA AND FIBULA, INITIAL ENCOUNTER: Primary | ICD-10-CM

## 2023-03-06 DIAGNOSIS — S82.402A CLOSED FRACTURE OF LEFT TIBIA AND FIBULA, INITIAL ENCOUNTER: Primary | ICD-10-CM

## 2023-03-06 RX ORDER — ASPIRIN 81 MG/1
81 TABLET ORAL 2 TIMES DAILY
Qty: 56 TABLET | Refills: 0 | Status: SHIPPED | OUTPATIENT
Start: 2023-03-06 | End: 2023-04-03

## 2023-03-06 RX ORDER — OXYCODONE HYDROCHLORIDE AND ACETAMINOPHEN 5; 325 MG/1; MG/1
1 TABLET ORAL DAILY PRN
Qty: 7 TABLET | Refills: 0 | Status: SHIPPED | OUTPATIENT
Start: 2023-03-06 | End: 2023-03-13

## 2023-03-06 RX ORDER — LIDOCAINE 50 MG/G
1 PATCH TOPICAL EVERY 12 HOURS
Qty: 30 PATCH | Refills: 0 | Status: SHIPPED | OUTPATIENT
Start: 2023-03-06

## 2023-03-06 NOTE — TELEPHONE ENCOUNTER
Saúl Soni called requesting refill of ASA, Lidocaine patch, and percocet for Harper.      Procedure: 1/21/23  Open reduction internal fixation left tibial pilon fracture, tibia only with plate and screws    LOV: 2/8/23    Future Appointments   Date Time Provider Francisco Randhawa   3/8/2023  9:30 AM Charlene Mullins MD [facility]     Pend and routed

## 2023-03-06 NOTE — TELEPHONE ENCOUNTER
Controlled Substance Monitoring:    Acute and Chronic Pain Monitoring:   RX Monitoring 3/6/2023   Periodic Controlled Substance Monitoring No signs of potential drug abuse or diversion identified.

## 2023-03-08 ENCOUNTER — HOSPITAL ENCOUNTER (OUTPATIENT)
Dept: GENERAL RADIOLOGY | Age: 38
Discharge: HOME | End: 2023-03-10
Payer: MEDICARE

## 2023-03-08 ENCOUNTER — OFFICE VISIT (OUTPATIENT)
Dept: ORTHOPEDIC SURGERY | Age: 38
End: 2023-03-08
Payer: MEDICARE

## 2023-03-08 DIAGNOSIS — S82.872A CLOSED LEFT PILON FRACTURE, INITIAL ENCOUNTER: ICD-10-CM

## 2023-03-08 DIAGNOSIS — S82.402A CLOSED FRACTURE OF LEFT TIBIA AND FIBULA, INITIAL ENCOUNTER: Primary | ICD-10-CM

## 2023-03-08 DIAGNOSIS — S92.355D CLOSED NONDISPLACED FRACTURE OF FIFTH METATARSAL BONE OF LEFT FOOT WITH ROUTINE HEALING, SUBSEQUENT ENCOUNTER: ICD-10-CM

## 2023-03-08 DIAGNOSIS — S82.202A CLOSED FRACTURE OF LEFT TIBIA AND FIBULA, INITIAL ENCOUNTER: Primary | ICD-10-CM

## 2023-03-08 PROCEDURE — 99024 POSTOP FOLLOW-UP VISIT: CPT | Performed by: PHYSICIAN ASSISTANT

## 2023-03-08 PROCEDURE — 99213 OFFICE O/P EST LOW 20 MIN: CPT

## 2023-03-08 PROCEDURE — 73630 X-RAY EXAM OF FOOT: CPT

## 2023-03-08 PROCEDURE — 73610 X-RAY EXAM OF ANKLE: CPT

## 2023-03-08 PROCEDURE — L4350 ANKLE CONTROL ORTHO PRE OTS: HCPCS

## 2023-03-08 PROCEDURE — 73590 X-RAY EXAM OF LOWER LEG: CPT

## 2023-03-08 NOTE — PATIENT INSTRUCTIONS
Nonweightbearing left lower extremity. Patient was placed into a left lower extremity boot. Okay to remove the boot for hygiene, therapy and for ankle range of motion exercises when sitting around the house. Continue home therapy    Continue aspirin 81 mg twice daily for DVT prophylaxis    Follow-up in 4 weeks for reevaluation, x-rays and possible progression of weightbearing status. Call if any questions or concerns.

## 2023-03-08 NOTE — PROGRESS NOTES
Chief Complaint   Patient presents with    Post-Op Check     Left tibia ORIF 1/21/2023. OP:SURGEON: Dr. Rahul Garcia MD  DATE OF PROCEDURE: 1-21-23  PROCEDURE: Open reduction internal fixation left tibial pilon fracture, tibia only with plate and screws     POD: 6.5 weeks    Subjective:  Schuyler Vogt is following up from the above surgery. She is NWB on left lower extremity. Pain to extremity is none and is not taking prescribed pain medication. They denies numbness or tingling to the left lower extremity. Denies calf pain, chest pain, or shortness of breath. Patient continues to use DVT prophylaxis, ASA 81 mg BID. Patient is  participating in therapy, home health care . Patient is doing well after surgery. Presents today with her family. They states she is making progress in home therapy. Presents today for cast removal.  Denies any pain or problems in the ankle/foot. Review of Systems -  All pertinent positives/negative in HPI     Objective:    General: Alert and oriented X 3, normocephalic atraumatic, external ears and eye normal, sclera clear, no acute distress, respirations easy and unlabored with no audible wheezes, skin warm and dry, speech and dress appropriate for noted age, affect euthymic. Extremity:  Left Lower Extremity  Skin clean dry and intact, without signs of infection   Incision well-healed  moderate edema noted to the ankle/foot  Compartments supple throughout thigh and leg  Calf supple and not tender  negative Homans  Demonstrates active motion with ankle DF/PF, wiggles toes  Presents in a wheelchair. States sensation intact to touch in sural, deep peroneal, superficial peroneal, saphenous, posterior tibial  nerve distributions to foot/ankle. Palpable dorsalis pedis and posterior tibialis pulses, cap refill brisk in toes, foot warm/perfused. There were no vitals taken for this visit.     XR:   Left tib-fib, left ankle and left foot films demonstrate ORIF left tibia pilon fracture with plate and screws in stable position and alignment. No evidence of hardware loosening or failure. Nondisplaced stable healing fifth metatarsal fracture noted. No new abnormalities compared to prior x-rays. Assessment:   Diagnosis Orders   1. Closed fracture of left tibia and fibula, subsequent encounter        2. Closed nondisplaced fracture of fifth metatarsal bone of left foot with routine healing, subsequent encounter        3. Closed left pilon fracture, subsequent encounter          Plan:  Xrays reviewed with patient. Plan of care discussed in detail, all questions sought and answered to patients satisfaction at this time. Nonweightbearing left lower extremity. Patient was placed into a left lower extremity boot. Okay to remove the boot for hygiene, therapy and for ankle range of motion exercises when sitting around the house. Continue home therapy    Continue aspirin 81 mg twice daily for DVT prophylaxis    Follow-up in 4 weeks for reevaluation, x-rays and possible progression of weightbearing status. Call if any questions or concerns. Electronically signed by LG Platt on 3/8/2023 at 11:02 AM  Note: This report was completed using [employer] voiced recognition software. Every effort has been made to ensure accuracy; however, inadvertent computerized transcription errors may be present.

## 2023-04-05 ENCOUNTER — HOSPITAL ENCOUNTER (OUTPATIENT)
Dept: GENERAL RADIOLOGY | Age: 38
Discharge: HOME | End: 2023-04-07
Payer: MEDICARE

## 2023-04-05 ENCOUNTER — OFFICE VISIT (OUTPATIENT)
Dept: ORTHOPEDIC SURGERY | Age: 38
End: 2023-04-05
Payer: MEDICARE

## 2023-04-05 DIAGNOSIS — S82.202A CLOSED FRACTURE OF LEFT TIBIA AND FIBULA, INITIAL ENCOUNTER: ICD-10-CM

## 2023-04-05 DIAGNOSIS — S82.872A CLOSED LEFT PILON FRACTURE, INITIAL ENCOUNTER: ICD-10-CM

## 2023-04-05 DIAGNOSIS — M21.6X2 SKEW DEFORMITY OF LEFT FOOT: ICD-10-CM

## 2023-04-05 DIAGNOSIS — M25.362 PATELLAR INSTABILITY OF LEFT KNEE: ICD-10-CM

## 2023-04-05 DIAGNOSIS — S82.202A CLOSED FRACTURE OF LEFT TIBIA AND FIBULA, INITIAL ENCOUNTER: Primary | ICD-10-CM

## 2023-04-05 DIAGNOSIS — S92.355D CLOSED NONDISPLACED FRACTURE OF FIFTH METATARSAL BONE OF LEFT FOOT WITH ROUTINE HEALING, SUBSEQUENT ENCOUNTER: ICD-10-CM

## 2023-04-05 DIAGNOSIS — S82.402A CLOSED FRACTURE OF LEFT TIBIA AND FIBULA, INITIAL ENCOUNTER: Primary | ICD-10-CM

## 2023-04-05 DIAGNOSIS — S82.402A CLOSED FRACTURE OF LEFT TIBIA AND FIBULA, INITIAL ENCOUNTER: ICD-10-CM

## 2023-04-05 PROBLEM — Z95.2 PRESENCE OF PROSTHETIC HEART VALVE: Status: ACTIVE | Noted: 2023-01-26

## 2023-04-05 PROBLEM — Q90.9 DOWN SYNDROME, UNSPECIFIED: Status: ACTIVE | Noted: 2023-01-26

## 2023-04-05 PROCEDURE — 73560 X-RAY EXAM OF KNEE 1 OR 2: CPT

## 2023-04-05 PROCEDURE — 99024 POSTOP FOLLOW-UP VISIT: CPT | Performed by: PHYSICIAN ASSISTANT

## 2023-04-05 PROCEDURE — 73610 X-RAY EXAM OF ANKLE: CPT

## 2023-04-05 PROCEDURE — 73630 X-RAY EXAM OF FOOT: CPT

## 2023-04-05 PROCEDURE — 99213 OFFICE O/P EST LOW 20 MIN: CPT

## 2023-04-05 RX ORDER — IBUPROFEN 600 MG/1
600 TABLET ORAL 4 TIMES DAILY PRN
Qty: 120 TABLET | Refills: 0 | Status: SHIPPED | OUTPATIENT
Start: 2023-04-05

## 2023-04-05 NOTE — PROGRESS NOTES
Jovani Scott is a 30 y.o. female who presents for follow up of 10 week Post-Op L Tibia ORIF    SURGEON: Dr. Jocelynn Connelly MD  Date of Injury/Surgery: 1-  Date last seen in office:  3-8-2023    Symptoms: better  New complaints: Pt declined any pain at the time of visit on 4-5-2023. Pt described minor localized in the Left Ankle. Pt has concerns of discoloration in the Second Toe of the Left Foot. Cast/Splint, Brace, or Dressings: Clean, dry and intact, Well fitting, and Taken down for visit    Weightbearing: left lower Non-weight bearing and Partial weight bearing      Assistive device Wheelchair and Walking Boot. Participating in therapy (location if yes)?  no    Refills Needed: IBU  Order/Referral Needed: N/A
sensation intact to touch in sural, deep peroneal, superficial peroneal, saphenous, posterior tibial  nerve distributions to foot/ankle. Palpable dorsalis pedis and posterior tibialis pulses, cap refill brisk in toes, foot warm/perfused. There were no vitals taken for this visit. XR:   Left tib-fib, left ankle and left foot films demonstrate ORIF left tibia pilon fracture with plate and screws in stable position and alignment. No evidence of hardware loosening or failure. Nondisplaced stable healing fifth metatarsal fracture noted. No new abnormalities compared to prior x-rays. Continued interval healing    Assessment:   Diagnosis Orders   1. Closed fracture of left tibia and fibula, subsequent encounter  Amb External Referral To Home Health    XR ANKLE LEFT (MIN 3 VIEWS)      2. Closed nondisplaced fracture of fifth metatarsal bone of left foot with routine healing, subsequent encounter  Amb External Referral To Home Health    XR FOOT LEFT (MIN 3 VIEWS)      3. Closed left pilon fracture, subsequent encounter  Amb External Referral To Home Health    XR ANKLE LEFT (MIN 3 VIEWS)      4. Skew deformity of left foot  Amb External Referral To Home Health    XR FOOT LEFT (MIN 3 VIEWS)      5. Patellar instability of left knee  Amb External Referral To Home Health    XR KNEE LEFT (3 VIEWS)      6. Closed fracture of left tibia and fibula, initial encounter  ibuprofen (ADVIL;MOTRIN) 600 MG tablet        Plan:  Xrays reviewed with patient and her family present today. Plan of care discussed in detail, all questions sought and answered to patients satisfaction at this time. Patient was also seen and evaluated with Dr. Krys Rodas today in clinic  We will allow patient to begin WB progressively on the LLE in CAM boot, will see how this changes the appearance of her left foot and ankle resting position, this is consistent with a skew foot deformity vs clubbing.    Updated home therapy orders written today  Will plan

## 2023-04-05 NOTE — PATIENT INSTRUCTIONS
Progress to Weightbearing as tolerated on left foot and ankle in boot with walker  Updated orders sent to home therapy today  Compression socks to the knee over the counter  Reassess alignment of her foot with weightbearing next office visit    Plan for follow up in 3 weeks with Dr. Leopoldo Melody for new xrays and exam

## 2023-04-24 RX ORDER — FAMOTIDINE 10 MG/1
TABLET ORAL
Qty: 60 TABLET | Refills: 1 | Status: SHIPPED | OUTPATIENT
Start: 2023-04-24

## 2023-04-24 NOTE — TELEPHONE ENCOUNTER
Pharmacy interface requesting refill of  pepcid .     Last office visit: 04/05/23    Future Appointments   Date Time Provider Francisco Randhawa   4/26/2023 10:45 AM Valery Cobb MD  [facility]       OP:SURGEON: Dr. Jenn Calderon MD  DATE OF PROCEDURE: 1-21-23  PROCEDURE: Open reduction internal fixation left tibial pilon fracture, tibia only with plate and screws

## 2023-04-26 ENCOUNTER — HOSPITAL ENCOUNTER (OUTPATIENT)
Dept: GENERAL RADIOLOGY | Age: 38
Discharge: HOME | End: 2023-04-28
Payer: MEDICARE

## 2023-04-26 ENCOUNTER — OFFICE VISIT (OUTPATIENT)
Dept: ORTHOPEDIC SURGERY | Age: 38
End: 2023-04-26
Payer: MEDICARE

## 2023-04-26 DIAGNOSIS — M21.6X2 SKEW DEFORMITY OF LEFT FOOT: ICD-10-CM

## 2023-04-26 DIAGNOSIS — S92.355D CLOSED NONDISPLACED FRACTURE OF FIFTH METATARSAL BONE OF LEFT FOOT WITH ROUTINE HEALING, SUBSEQUENT ENCOUNTER: ICD-10-CM

## 2023-04-26 DIAGNOSIS — M25.561 CHRONIC PAIN OF RIGHT KNEE: ICD-10-CM

## 2023-04-26 DIAGNOSIS — S82.202A CLOSED FRACTURE OF LEFT TIBIA AND FIBULA, INITIAL ENCOUNTER: ICD-10-CM

## 2023-04-26 DIAGNOSIS — M25.362 PATELLAR INSTABILITY OF LEFT KNEE: ICD-10-CM

## 2023-04-26 DIAGNOSIS — G89.29 CHRONIC PAIN OF RIGHT KNEE: ICD-10-CM

## 2023-04-26 DIAGNOSIS — M25.561 CHRONIC PAIN OF RIGHT KNEE: Primary | ICD-10-CM

## 2023-04-26 DIAGNOSIS — S82.402A CLOSED FRACTURE OF LEFT TIBIA AND FIBULA, INITIAL ENCOUNTER: ICD-10-CM

## 2023-04-26 DIAGNOSIS — S82.872A CLOSED LEFT PILON FRACTURE, INITIAL ENCOUNTER: ICD-10-CM

## 2023-04-26 DIAGNOSIS — G89.29 CHRONIC PAIN OF RIGHT KNEE: Primary | ICD-10-CM

## 2023-04-26 PROCEDURE — 73562 X-RAY EXAM OF KNEE 3: CPT

## 2023-04-26 PROCEDURE — 73610 X-RAY EXAM OF ANKLE: CPT

## 2023-04-26 PROCEDURE — 73630 X-RAY EXAM OF FOOT: CPT

## 2023-04-26 PROCEDURE — 99212 OFFICE O/P EST SF 10 MIN: CPT

## 2023-04-26 PROCEDURE — 99024 POSTOP FOLLOW-UP VISIT: CPT | Performed by: ORTHOPAEDIC SURGERY

## 2023-04-26 NOTE — PATIENT INSTRUCTIONS
Diclofenac Gel (Voltaren Gel) has recently become available over the counter for purchase without a prescription. This is the same strength and dosing as prescription strength. If this medication is prescribed and the copay is too expensive it may be less to buy over the counter.  Please ask your pharmacist.          For right knee

## 2023-04-27 NOTE — PROGRESS NOTES
Chief Complaint   Patient presents with    Follow Up After Procedure     Left tibia ORIF 1/21/2023. Patient in [facility] PT and has begun WB. Patient also has complaints of swelling in the left leg. Knee Pain     Right knee pain. Patient has history of knee scope with Dr. Rome Delgado approximately 5 years ago but patient began having pain recently. SUBJECTIVE: Patient is a 35-year female comes in today for follow-up on her left tibial reduction to fixation. She has been putting some weight on her left lower extremity in her walker boot. Her parents deny any pain at that area. She is having pain in her left foot at the fifth metatarsal fracture with a sitting better as well. She has bilateral knee pain which they state has been chronic although currently her right knee is worse than usual she demarcated. Denies further falls or injuries. Review of Systems -   General ROS: negative for - chills, fatigue, fever or night sweats  Respiratory ROS: no cough, shortness of breath, or wheezing  Cardiovascular ROS: no chest pain or dyspnea on exertion  Gastrointestinal ROS: no abdominal pain, change in bowel habits, or black or bloody stools  Genitourinary: no hematuria, dysuria, or incontinence   Musculoskeletal ROS:see above  Neurological ROS: no TIA or stroke symptoms       OBJECTIVE:   Alert and oriented X 3, no acute distress, respirations easy and unlabored with no audible wheezes, skin warm and dry, speech and dress appropriate for noted age, affect euthymic.     Extremity:  Left lower extremity  Incision well-healed  No dislocation or fracture site  Compartment soft compressible  Calf soft nontender  Palp DP PT pulses  We will toes  Screw sensation tact distally  No pinpoint tenderness to palpation over fifth metatarsal  She has bilateral arch feet they are very similar appearing on physical exam  Cap refills with 3-second    Bilateral knees have lateral subluxed patella, there engaged the trochlea on about

## 2023-05-04 ENCOUNTER — TELEPHONE (OUTPATIENT)
Dept: ORTHOPEDIC SURGERY | Age: 38
End: 2023-05-04

## 2023-05-04 NOTE — TELEPHONE ENCOUNTER
Ozzie Roberson returned call. Letter written at this time and faxed to requested number, [phone].

## 2023-05-04 NOTE — TELEPHONE ENCOUNTER
Called patient's mother [place] Liming back, no answer. Left message, for Mariam Liming stating that we can generate doctors excuse just needed more information.        Future Appointments   Date Time Provider Francisco Randhawa   5/31/2023 10:30 AM Jaron Bryant MD [facility]

## 2023-05-29 DIAGNOSIS — R52 PAIN: Primary | ICD-10-CM

## 2023-05-31 ENCOUNTER — HOSPITAL ENCOUNTER (OUTPATIENT)
Dept: GENERAL RADIOLOGY | Age: 38
Discharge: HOME | End: 2023-06-02
Payer: MEDICARE

## 2023-05-31 ENCOUNTER — OFFICE VISIT (OUTPATIENT)
Dept: ORTHOPEDIC SURGERY | Age: 38
End: 2023-05-31
Payer: MEDICARE

## 2023-05-31 DIAGNOSIS — M21.6X2 SKEW DEFORMITY OF LEFT FOOT: ICD-10-CM

## 2023-05-31 DIAGNOSIS — R52 PAIN: ICD-10-CM

## 2023-05-31 DIAGNOSIS — G89.29 CHRONIC PAIN OF RIGHT KNEE: Primary | ICD-10-CM

## 2023-05-31 DIAGNOSIS — M17.11 PRIMARY OSTEOARTHRITIS OF RIGHT KNEE: ICD-10-CM

## 2023-05-31 DIAGNOSIS — S82.402A CLOSED FRACTURE OF LEFT TIBIA AND FIBULA, INITIAL ENCOUNTER: ICD-10-CM

## 2023-05-31 DIAGNOSIS — S82.872A CLOSED LEFT PILON FRACTURE, INITIAL ENCOUNTER: ICD-10-CM

## 2023-05-31 DIAGNOSIS — S82.202A CLOSED FRACTURE OF LEFT TIBIA AND FIBULA, INITIAL ENCOUNTER: ICD-10-CM

## 2023-05-31 DIAGNOSIS — S92.355D CLOSED NONDISPLACED FRACTURE OF FIFTH METATARSAL BONE OF LEFT FOOT WITH ROUTINE HEALING, SUBSEQUENT ENCOUNTER: ICD-10-CM

## 2023-05-31 DIAGNOSIS — M25.561 CHRONIC PAIN OF RIGHT KNEE: Primary | ICD-10-CM

## 2023-05-31 PROCEDURE — 20610 DRAIN/INJ JOINT/BURSA W/O US: CPT | Performed by: PHYSICIAN ASSISTANT

## 2023-05-31 PROCEDURE — 99213 OFFICE O/P EST LOW 20 MIN: CPT

## 2023-05-31 PROCEDURE — 73630 X-RAY EXAM OF FOOT: CPT

## 2023-05-31 PROCEDURE — 73565 X-RAY EXAM OF KNEES: CPT

## 2023-05-31 PROCEDURE — 73610 X-RAY EXAM OF ANKLE: CPT

## 2023-05-31 RX ORDER — LIDOCAINE HYDROCHLORIDE 10 MG/ML
3 INJECTION, SOLUTION INFILTRATION; PERINEURAL ONCE
Status: COMPLETED | OUTPATIENT
Start: 2023-05-31 | End: 2023-05-31

## 2023-05-31 RX ORDER — TRIAMCINOLONE ACETONIDE 40 MG/ML
40 INJECTION, SUSPENSION INTRA-ARTICULAR; INTRAMUSCULAR ONCE
Status: COMPLETED | OUTPATIENT
Start: 2023-05-31 | End: 2023-05-31

## 2023-05-31 RX ORDER — BUPIVACAINE HYDROCHLORIDE 2.5 MG/ML
3 INJECTION, SOLUTION EPIDURAL; INFILTRATION; INTRACAUDAL ONCE
Status: COMPLETED | OUTPATIENT
Start: 2023-05-31 | End: 2023-05-31

## 2023-05-31 RX ADMIN — BUPIVACAINE HYDROCHLORIDE 7.5 MG: 2.5 INJECTION, SOLUTION EPIDURAL; INFILTRATION; INTRACAUDAL at 12:00

## 2023-05-31 RX ADMIN — LIDOCAINE HYDROCHLORIDE 3 ML: 10 INJECTION, SOLUTION INFILTRATION; PERINEURAL at 12:00

## 2023-05-31 RX ADMIN — TRIAMCINOLONE ACETONIDE 40 MG: 40 INJECTION, SUSPENSION INTRA-ARTICULAR; INTRAMUSCULAR at 12:00

## 2023-05-31 NOTE — PROGRESS NOTES
conditions with a combination 3 cc Marcaine, 3 cc lidocaine and 1 cc Kenalog without complications. She left the office without difficulty. Follow-up in 3 months for reevaluation and x-rays. Call if any questions or concerns. Electronically signed by LG Erwin on 5/31/2023 at 11:24 AM  Note: This report was completed using [employer] voiced recognition software. Every effort has been made to ensure accuracy; however, inadvertent computerized transcription errors may be present.

## 2023-05-31 NOTE — PATIENT INSTRUCTIONS
Weightbearing as tolerated left lower extremity. Okay to transition from the boot to a regular shoe if not having pain. Patient was given a left knee CSI today. Follow-up in 3 months for reevaluation and x-rays. Call if any questions or concerns.

## 2023-06-26 DIAGNOSIS — S82.402A CLOSED FRACTURE OF LEFT TIBIA AND FIBULA, INITIAL ENCOUNTER: Primary | ICD-10-CM

## 2023-06-26 DIAGNOSIS — S82.202A CLOSED FRACTURE OF LEFT TIBIA AND FIBULA, INITIAL ENCOUNTER: Primary | ICD-10-CM

## 2023-06-26 RX ORDER — FAMOTIDINE 10 MG
TABLET ORAL
Qty: 60 TABLET | Refills: 2 | Status: CANCELLED | OUTPATIENT
Start: 2023-06-26

## 2023-07-13 ENCOUNTER — TELEPHONE (OUTPATIENT)
Dept: ORTHOPEDIC SURGERY | Age: 38
End: 2023-07-13

## 2023-07-13 NOTE — TELEPHONE ENCOUNTER
Patient's mother called requesting a letter for patient to return to work. Requests call back.      Future Appointments   Date Time Provider [address]  [address]   8/30/2023 10:30 AM Jt Mercedes MD [facility]     Routed

## 2023-07-13 NOTE — TELEPHONE ENCOUNTER
Called patient's mother regarding letter for work. Requesting to have letter mailed. Also requesting for another CSI but in L knee this time. Schedule patient for L knee CSI at this time.

## 2023-08-02 ENCOUNTER — OFFICE VISIT (OUTPATIENT)
Dept: ORTHOPEDIC SURGERY | Age: 38
End: 2023-08-02
Payer: MEDICARE

## 2023-08-02 VITALS — HEIGHT: 60 IN | BODY MASS INDEX: 32.39 KG/M2 | WEIGHT: 165 LBS

## 2023-08-02 DIAGNOSIS — M17.12 PRIMARY OSTEOARTHRITIS OF LEFT KNEE: Primary | ICD-10-CM

## 2023-08-02 PROCEDURE — 99213 OFFICE O/P EST LOW 20 MIN: CPT

## 2023-08-02 PROCEDURE — 20610 DRAIN/INJ JOINT/BURSA W/O US: CPT | Performed by: ORTHOPAEDIC SURGERY

## 2023-08-02 RX ORDER — LIDOCAINE HYDROCHLORIDE 10 MG/ML
3 INJECTION, SOLUTION INFILTRATION; PERINEURAL ONCE
Status: COMPLETED | OUTPATIENT
Start: 2023-08-02 | End: 2023-08-02

## 2023-08-02 RX ORDER — TRIAMCINOLONE ACETONIDE 40 MG/ML
40 INJECTION, SUSPENSION INTRA-ARTICULAR; INTRAMUSCULAR ONCE
Status: COMPLETED | OUTPATIENT
Start: 2023-08-02 | End: 2023-08-02

## 2023-08-02 RX ORDER — BUPIVACAINE HYDROCHLORIDE 2.5 MG/ML
3 INJECTION, SOLUTION EPIDURAL; INFILTRATION; INTRACAUDAL ONCE
Status: COMPLETED | OUTPATIENT
Start: 2023-08-02 | End: 2023-08-02

## 2023-08-02 RX ADMIN — LIDOCAINE HYDROCHLORIDE 3 ML: 10 INJECTION, SOLUTION INFILTRATION; PERINEURAL at 10:17

## 2023-08-02 RX ADMIN — BUPIVACAINE HYDROCHLORIDE 7.5 MG: 2.5 INJECTION, SOLUTION EPIDURAL; INFILTRATION; INTRACAUDAL at 10:16

## 2023-08-02 RX ADMIN — TRIAMCINOLONE ACETONIDE 40 MG: 40 INJECTION, SUSPENSION INTRA-ARTICULAR; INTRAMUSCULAR at 10:17

## 2023-08-02 NOTE — PROGRESS NOTES
Chip Nelson is a 41 y.o. female for evaluation of her left knee pain. She is a chronic left knee pain she recent had a right knee injection which significantly helped she would like to proceed with a left knee injection. She is coming by her mom today. They understand the risk of infection and cartilage degradation elected proceed with a left knee steroid injection. PE:    Skin - Clean, dry and intact  Effusion is not present    Procedure Note  Skin prepped with alcohol.  21ga. Needle used to inject 3cc 1% Lidocaine, 3cc 0.25% Marcaine, and 1cc Kenalog into the left knee through anterior medial portal.  Patient tolerated well and band aid applied. Walked out of the office with no issues. Diagnosis Orders   1. Primary osteoarthritis of left knee            Plan:   Follow-up for potential next injection in 3 months if need be    Continue with activity and exercise as tolerated. Maximal improve occurs about 6 weeks after you complete the series of injections. The injection site may become sore for several days after your shot, it may also bruise, you can put ice on it. Call the office if you notice any unusual swelling or redness around you injection site.

## 2023-08-02 NOTE — PATIENT INSTRUCTIONS
Continue with activity and exercise as tolerated. Maximal improve occurs about 6 weeks after you complete the series of injections. The injection site may become sore for several days after your shot, it may also bruise, you can put ice on it. Call the office if you notice any unusual swelling or redness around you injection site.

## 2023-08-29 ENCOUNTER — TELEPHONE (OUTPATIENT)
Dept: ORTHOPEDIC SURGERY | Age: 38
End: 2023-08-29

## 2023-08-29 NOTE — TELEPHONE ENCOUNTER
Patient called and left a voicemail.  Returned patient phone call 8/29/23 at 7:46 am behalf of cx appt for 8/30/23

## 2023-10-03 DIAGNOSIS — S82.202A CLOSED FRACTURE OF LEFT TIBIA AND FIBULA, INITIAL ENCOUNTER: ICD-10-CM

## 2023-10-03 DIAGNOSIS — S82.402A CLOSED FRACTURE OF LEFT TIBIA AND FIBULA, INITIAL ENCOUNTER: ICD-10-CM

## 2023-10-04 NOTE — TELEPHONE ENCOUNTER
Medication refill request received via pharmacy interface. OP:SURGEON: Dr. Claudia Akins MD  DATE OF PROCEDURE: 1-21-23  PROCEDURE: Open reduction internal fixation left tibial pilon fracture, tibia only with plate and screws    Last OV: 8/2/2023 for follow up and Right knee CSI    Medication pended and routed to providers for decision and signature.     Future Appointments   Date Time Provider [address]  [facility]   11/1/2023  8:45 AM Deandre Dowd MD [facility]       Electronically signed by Tena Almodovar ATC on 10/4/23 at 4:21 PM EDT

## 2023-10-05 RX ORDER — METHOCARBAMOL 750 MG/1
TABLET, FILM COATED ORAL
Qty: 40 TABLET | Refills: 3 | Status: SHIPPED | OUTPATIENT
Start: 2023-10-05

## 2023-11-01 ENCOUNTER — OFFICE VISIT (OUTPATIENT)
Dept: ORTHOPEDIC SURGERY | Age: 38
End: 2023-11-01
Payer: MEDICARE

## 2023-11-01 DIAGNOSIS — M17.11 PRIMARY OSTEOARTHRITIS OF RIGHT KNEE: Primary | ICD-10-CM

## 2023-11-01 PROCEDURE — 20610 DRAIN/INJ JOINT/BURSA W/O US: CPT | Performed by: PHYSICIAN ASSISTANT

## 2023-11-01 RX ORDER — LIDOCAINE HYDROCHLORIDE 10 MG/ML
3 INJECTION, SOLUTION INFILTRATION; PERINEURAL ONCE
Status: COMPLETED | OUTPATIENT
Start: 2023-11-01 | End: 2023-11-01

## 2023-11-01 RX ORDER — BUPIVACAINE HYDROCHLORIDE 2.5 MG/ML
3 INJECTION, SOLUTION INFILTRATION; PERINEURAL ONCE
Status: COMPLETED | OUTPATIENT
Start: 2023-11-01 | End: 2023-11-01

## 2023-11-01 RX ORDER — TRIAMCINOLONE ACETONIDE 40 MG/ML
40 INJECTION, SUSPENSION INTRA-ARTICULAR; INTRAMUSCULAR ONCE
Status: COMPLETED | OUTPATIENT
Start: 2023-11-01 | End: 2023-11-01

## 2023-11-01 RX ADMIN — BUPIVACAINE HYDROCHLORIDE 7.5 MG: 2.5 INJECTION, SOLUTION INFILTRATION; PERINEURAL at 16:15

## 2023-11-01 RX ADMIN — TRIAMCINOLONE ACETONIDE 40 MG: 40 INJECTION, SUSPENSION INTRA-ARTICULAR; INTRAMUSCULAR at 16:15

## 2023-11-01 RX ADMIN — LIDOCAINE HYDROCHLORIDE 3 ML: 10 INJECTION, SOLUTION INFILTRATION; PERINEURAL at 16:15

## 2024-02-14 ENCOUNTER — OFFICE VISIT (OUTPATIENT)
Dept: ORTHOPEDIC SURGERY | Age: 39
End: 2024-02-14
Payer: MEDICARE

## 2024-02-14 DIAGNOSIS — M17.11 PRIMARY OSTEOARTHRITIS OF RIGHT KNEE: Primary | ICD-10-CM

## 2024-02-14 DIAGNOSIS — M17.12 PRIMARY OSTEOARTHRITIS OF LEFT KNEE: ICD-10-CM

## 2024-02-14 PROCEDURE — 20610 DRAIN/INJ JOINT/BURSA W/O US: CPT | Performed by: PHYSICIAN ASSISTANT

## 2024-02-14 PROCEDURE — 99203 OFFICE O/P NEW LOW 30 MIN: CPT

## 2024-02-14 RX ORDER — BUPIVACAINE HYDROCHLORIDE 2.5 MG/ML
6 INJECTION, SOLUTION INFILTRATION; PERINEURAL ONCE
Status: COMPLETED | OUTPATIENT
Start: 2024-02-14 | End: 2024-02-14

## 2024-02-14 RX ORDER — LIDOCAINE HYDROCHLORIDE 10 MG/ML
6 INJECTION, SOLUTION INFILTRATION; PERINEURAL ONCE
Status: COMPLETED | OUTPATIENT
Start: 2024-02-14 | End: 2024-02-14

## 2024-02-14 RX ORDER — TRIAMCINOLONE ACETONIDE 40 MG/ML
80 INJECTION, SUSPENSION INTRA-ARTICULAR; INTRAMUSCULAR ONCE
Status: COMPLETED | OUTPATIENT
Start: 2024-02-14 | End: 2024-02-14

## 2024-02-14 RX ADMIN — LIDOCAINE HYDROCHLORIDE 6 ML: 10 INJECTION, SOLUTION INFILTRATION; PERINEURAL at 11:30

## 2024-02-14 RX ADMIN — BUPIVACAINE HYDROCHLORIDE 15 MG: 2.5 INJECTION, SOLUTION INFILTRATION; PERINEURAL at 11:30

## 2024-02-14 RX ADMIN — TRIAMCINOLONE ACETONIDE 80 MG: 40 INJECTION, SUSPENSION INTRA-ARTICULAR; INTRAMUSCULAR at 11:30

## 2024-02-14 NOTE — PROGRESS NOTES
Chief Complaint   Patient presents with    Follow-up     Pt f/u for bilateral knee CSI inj. Pt states 8/10 pain of bilat knees.        SUBJECTIVE: Harper Fajardo is a 38-year-old female who presents today for follow-up of bilateral knee pain.  She presents today with her mother.  She did get good relief from her last set of injections and she would like repeat shots today.  She states the pain is about equal in both knees.  Rates her pain as 8/10.  Pain is worse with prolonged standing, walking or going up and down steps.  Denies recent falls or injuries but states that sometimes it feels like her knees are going to give out and buckle on her.  She denies mechanical symptoms such as clicking or popping in the knees.  Denies numbness, tingling or paresthesias.  No other orthopedic complaints at this time.    Review of Systems -   General ROS: negative for - chills, fatigue, fever or night sweats  Respiratory ROS: no cough, shortness of breath, or wheezing  Cardiovascular ROS: no chest pain or dyspnea on exertion  Gastrointestinal ROS: no abdominal pain, change in bowel habits, or black or bloody stools  Genitourinary: no hematuria, dysuria, or incontinence   Musculoskeletal ROS:see above  Neurological ROS: no TIA or stroke symptoms     OBJECTIVE:   Alert and oriented X 3, no acute distress, respirations easy and unlabored with no audible wheezes, skin warm and dry, speech and dress appropriate for noted age, affect euthymic.    bilateral Knee Exam  Skin intact. No erythema/induration/fluctuence at knee.   no effusion noted  Patella tracks normally  negative patellar grind test and negative J sign.   mild crepitus with flexion and extension of the knee  both Medial and Lateral joint line pain with palpation  Stable to varus and valgus at 0 and 30 degrees of flexion.    negative McMurrays, Apleys.  negative Lachman's and posterior drawer.   Active range of motion 0-120 ° with pain   Compartments soft and compressible

## 2024-05-10 DIAGNOSIS — M17.12 PRIMARY OSTEOARTHRITIS OF LEFT KNEE: ICD-10-CM

## 2024-05-10 DIAGNOSIS — M17.11 PRIMARY OSTEOARTHRITIS OF RIGHT KNEE: Primary | ICD-10-CM

## 2024-05-15 ENCOUNTER — HOSPITAL ENCOUNTER (OUTPATIENT)
Dept: GENERAL RADIOLOGY | Age: 39
Discharge: HOME | End: 2024-05-17
Payer: MEDICARE

## 2024-05-15 ENCOUNTER — OFFICE VISIT (OUTPATIENT)
Dept: ORTHOPEDIC SURGERY | Age: 39
End: 2024-05-15
Payer: MEDICARE

## 2024-05-15 DIAGNOSIS — M17.11 PRIMARY OSTEOARTHRITIS OF RIGHT KNEE: ICD-10-CM

## 2024-05-15 DIAGNOSIS — M17.12 PRIMARY OSTEOARTHRITIS OF LEFT KNEE: ICD-10-CM

## 2024-05-15 DIAGNOSIS — M17.11 PRIMARY OSTEOARTHRITIS OF RIGHT KNEE: Primary | ICD-10-CM

## 2024-05-15 PROCEDURE — 73565 X-RAY EXAM OF KNEES: CPT

## 2024-05-15 PROCEDURE — 99213 OFFICE O/P EST LOW 20 MIN: CPT

## 2024-05-15 PROCEDURE — 20610 DRAIN/INJ JOINT/BURSA W/O US: CPT | Performed by: ORTHOPAEDIC SURGERY

## 2024-05-15 RX ORDER — BUPIVACAINE HYDROCHLORIDE 2.5 MG/ML
6 INJECTION, SOLUTION INFILTRATION; PERINEURAL ONCE
Status: COMPLETED | OUTPATIENT
Start: 2024-05-15 | End: 2024-05-15

## 2024-05-15 RX ORDER — TRIAMCINOLONE ACETONIDE 40 MG/ML
80 INJECTION, SUSPENSION INTRA-ARTICULAR; INTRAMUSCULAR ONCE
Status: COMPLETED | OUTPATIENT
Start: 2024-05-15 | End: 2024-05-15

## 2024-05-15 RX ORDER — LIDOCAINE HYDROCHLORIDE 10 MG/ML
6 INJECTION, SOLUTION INFILTRATION; PERINEURAL ONCE
Status: COMPLETED | OUTPATIENT
Start: 2024-05-15 | End: 2024-05-15

## 2024-05-15 RX ADMIN — BUPIVACAINE HYDROCHLORIDE 15 MG: 2.5 INJECTION, SOLUTION INFILTRATION; PERINEURAL at 09:38

## 2024-05-15 RX ADMIN — TRIAMCINOLONE ACETONIDE 80 MG: 40 INJECTION, SUSPENSION INTRA-ARTICULAR; INTRAMUSCULAR at 09:38

## 2024-05-15 RX ADMIN — LIDOCAINE HYDROCHLORIDE 6 ML: 10 INJECTION, SOLUTION INFILTRATION; PERINEURAL at 09:38

## 2024-05-15 NOTE — PROGRESS NOTES
5/15/24-none taken today    There were no vitals taken for this visit.    ASSESSMENT:     Diagnosis Orders   1. Primary osteoarthritis of right knee  UT ARTHROCENTESIS ASPIR&/INJ MAJOR JT/BURSA W/O US    BUPivacaine (MARCAINE) 0.25 % injection 15 mg    lidocaine 1 % injection 6 mL    triamcinolone acetonide (KENALOG-40) injection 80 mg    UT ARTHROCENTESIS ASPIR&/INJ MAJOR JT/BURSA W/O US      2. Primary osteoarthritis of left knee  UT ARTHROCENTESIS ASPIR&/INJ MAJOR JT/BURSA W/O US    BUPivacaine (MARCAINE) 0.25 % injection 15 mg    lidocaine 1 % injection 6 mL    triamcinolone acetonide (KENALOG-40) injection 80 mg    UT ARTHROCENTESIS ASPIR&/INJ MAJOR JT/BURSA W/O US        Discussion: We discussed again the risk of cartilage degradation infection from her bilateral knee injections, the mom says they have been working well would like to proceed with bilateral knee injections today.    Procedure Note  Skin prepped with alcohol.  21ga. Needle used to inject  3cc 0.25% Marcaine, and 2cc Kenalog into the right knee through anterior medial portal.  Patient tolerated well and band aid applied.  Walked out of the office with no issues.  This was repeated on the left knee with identical qualities of injection with no complication.  Patient was able to walk out of the office without any issues after bilateral knee injections today.      PLAN:  Activity as tolerated    Follow-up in 3 months    Call sooner with any issues with injection sites, questions or concerns    ELECTRONICALLY signed by:    Mehul Dowd MD  5/15/24    NOTE: This report was transcribed using voice recognition software. Every effort was made to ensure accuracy; however, inadvertent computerized transcription errors may be present

## 2024-05-15 NOTE — PATIENT INSTRUCTIONS
Activity as tolerated    Follow-up in 3 months    Call sooner with any issues with injection sites, questions or concerns

## 2024-08-21 ENCOUNTER — OFFICE VISIT (OUTPATIENT)
Dept: ORTHOPEDIC SURGERY | Age: 39
End: 2024-08-21
Payer: MEDICARE

## 2024-08-21 DIAGNOSIS — M17.11 PRIMARY OSTEOARTHRITIS OF RIGHT KNEE: Primary | ICD-10-CM

## 2024-08-21 DIAGNOSIS — M17.12 PRIMARY OSTEOARTHRITIS OF LEFT KNEE: ICD-10-CM

## 2024-08-21 PROCEDURE — 99213 OFFICE O/P EST LOW 20 MIN: CPT

## 2024-08-21 PROCEDURE — 20610 DRAIN/INJ JOINT/BURSA W/O US: CPT | Performed by: ORTHOPAEDIC SURGERY

## 2024-08-21 RX ORDER — MULTIVIT-MIN/IRON/FOLIC ACID/K 18-600-40
2000 CAPSULE ORAL DAILY
COMMUNITY

## 2024-08-21 RX ORDER — BUPIVACAINE HYDROCHLORIDE 2.5 MG/ML
6 INJECTION, SOLUTION INFILTRATION; PERINEURAL ONCE
Status: COMPLETED | OUTPATIENT
Start: 2024-08-21 | End: 2024-08-21

## 2024-08-21 RX ORDER — TRIAMCINOLONE ACETONIDE 40 MG/ML
40 INJECTION, SUSPENSION INTRA-ARTICULAR; INTRAMUSCULAR ONCE
Status: COMPLETED | OUTPATIENT
Start: 2024-08-21 | End: 2024-08-21

## 2024-08-21 RX ADMIN — TRIAMCINOLONE ACETONIDE 40 MG: 40 INJECTION, SUSPENSION INTRA-ARTICULAR; INTRAMUSCULAR at 09:43

## 2024-08-21 RX ADMIN — BUPIVACAINE HYDROCHLORIDE 15 MG: 2.5 INJECTION, SOLUTION INFILTRATION; PERINEURAL at 09:42

## 2024-09-04 ENCOUNTER — TELEPHONE (OUTPATIENT)
Dept: ORTHOPEDIC SURGERY | Age: 39
End: 2024-09-04

## 2024-11-22 ENCOUNTER — OFFICE VISIT (OUTPATIENT)
Dept: ORTHOPEDIC SURGERY | Age: 39
End: 2024-11-22
Payer: MEDICARE

## 2024-11-22 VITALS
SYSTOLIC BLOOD PRESSURE: 134 MMHG | TEMPERATURE: 96.7 F | HEART RATE: 87 BPM | OXYGEN SATURATION: 100 % | DIASTOLIC BLOOD PRESSURE: 88 MMHG | RESPIRATION RATE: 16 BRPM

## 2024-11-22 DIAGNOSIS — M17.11 PRIMARY OSTEOARTHRITIS OF RIGHT KNEE: Primary | ICD-10-CM

## 2024-11-22 DIAGNOSIS — M17.12 PRIMARY OSTEOARTHRITIS OF LEFT KNEE: ICD-10-CM

## 2024-11-22 PROCEDURE — 99213 OFFICE O/P EST LOW 20 MIN: CPT | Performed by: PHYSICIAN ASSISTANT

## 2024-11-22 PROCEDURE — 20610 DRAIN/INJ JOINT/BURSA W/O US: CPT | Performed by: PHYSICIAN ASSISTANT

## 2024-11-22 RX ORDER — TRIAMCINOLONE ACETONIDE 40 MG/ML
40 INJECTION, SUSPENSION INTRA-ARTICULAR; INTRAMUSCULAR ONCE
Status: COMPLETED | OUTPATIENT
Start: 2024-11-22 | End: 2024-11-22

## 2024-11-22 RX ORDER — BUPIVACAINE HYDROCHLORIDE 2.5 MG/ML
6 INJECTION, SOLUTION INFILTRATION; PERINEURAL ONCE
Status: COMPLETED | OUTPATIENT
Start: 2024-11-22 | End: 2024-11-22

## 2024-11-22 RX ADMIN — TRIAMCINOLONE ACETONIDE 40 MG: 40 INJECTION, SUSPENSION INTRA-ARTICULAR; INTRAMUSCULAR at 09:05

## 2024-11-22 RX ADMIN — BUPIVACAINE HYDROCHLORIDE 15 MG: 2.5 INJECTION, SOLUTION INFILTRATION; PERINEURAL at 09:05

## 2024-11-22 NOTE — PROGRESS NOTES
intact. No erythema/induration/fluctuence at knee.   No effusion noted  Atraumatic  Patella tracks normally  Mild crepitus with flexion and extension of the knee  Medial greater than joint line pain with palpation  Active range of motion 0-120 without pain.   Compartments soft and compressible throughout leg.  Calf soft and nontender with palpation    Demonstrates active ankle plantar/dorsiflexion/great toe extension.   NVI distally at baseline      Impression:   Encounter Diagnoses   Name Primary?    Primary osteoarthritis of right knee Yes    Primary osteoarthritis of left knee        Plan:   Discussed findings with the patient and her mother at today's visit. Discussed conservative options for the patients bilateral knee pain including rest, ice, antiinflammatories, Voltaren gel, tylenol, bracing, supervised physician home exercise program, OT/PT, and cortisone injections. No further imagining is necessary at this time. Patient is happy with their response to cortisone previously, especially on the left lower extremity. Patient wished to proceed with injections. Repeat injections provided to bilateral lower extremities. Patient consented and this was tolerated well. Patient may repeat injections every 3 months as needed. Follow up as needed for repeat injections vs surgical management. All questions answered.    Discussed risks and benefits of CSI including risk of infection at the joint, bleeding or bruising at the injection site and elevation of blood sugar levels and cartilage degradation with repetitive use. Patient expressed understanding of these risks and elected to move forward with corticosteroid injection today in the office.     Knee  Injection Procedure Note  With the patient's written and verbal permission, Bilateral  knee was prepped in standard sterile fashion with betadine and alcohol. Skin of the knee was anesthetized with ethyl chloride spray prior to injection. The knee was then injected with 2

## 2025-03-05 ENCOUNTER — OFFICE VISIT (OUTPATIENT)
Dept: ORTHOPEDIC SURGERY | Age: 40
End: 2025-03-05
Payer: MEDICARE

## 2025-03-05 VITALS
HEART RATE: 74 BPM | TEMPERATURE: 97.4 F | WEIGHT: 173.06 LBS | HEIGHT: 60 IN | OXYGEN SATURATION: 98 % | SYSTOLIC BLOOD PRESSURE: 136 MMHG | BODY MASS INDEX: 33.98 KG/M2 | DIASTOLIC BLOOD PRESSURE: 78 MMHG | RESPIRATION RATE: 20 BRPM

## 2025-03-05 DIAGNOSIS — M17.11 PRIMARY OSTEOARTHRITIS OF RIGHT KNEE: Primary | ICD-10-CM

## 2025-03-05 DIAGNOSIS — M17.12 PRIMARY OSTEOARTHRITIS OF LEFT KNEE: ICD-10-CM

## 2025-03-05 PROCEDURE — 99213 OFFICE O/P EST LOW 20 MIN: CPT | Performed by: PHYSICIAN ASSISTANT

## 2025-03-05 RX ORDER — TRIAMCINOLONE ACETONIDE 40 MG/ML
40 INJECTION, SUSPENSION INTRA-ARTICULAR; INTRAMUSCULAR ONCE
Status: COMPLETED | OUTPATIENT
Start: 2025-03-05 | End: 2025-03-05

## 2025-03-05 RX ORDER — BUPIVACAINE HYDROCHLORIDE 2.5 MG/ML
6 INJECTION, SOLUTION INFILTRATION; PERINEURAL ONCE
Status: COMPLETED | OUTPATIENT
Start: 2025-03-05 | End: 2025-03-05

## 2025-03-05 RX ADMIN — TRIAMCINOLONE ACETONIDE 40 MG: 40 INJECTION, SUSPENSION INTRA-ARTICULAR; INTRAMUSCULAR at 08:33

## 2025-03-05 RX ADMIN — TRIAMCINOLONE ACETONIDE 40 MG: 40 INJECTION, SUSPENSION INTRA-ARTICULAR; INTRAMUSCULAR at 08:34

## 2025-03-05 RX ADMIN — BUPIVACAINE HYDROCHLORIDE 15 MG: 2.5 INJECTION, SOLUTION INFILTRATION; PERINEURAL at 08:32

## 2025-03-05 NOTE — PROGRESS NOTES
Chief Complaint   Patient presents with    Follow-up      3 month f/u for Mars. Knee Pain, Mars. knee CSI (last inj was 11/22/2024) TTS. last injection lasted 2 months         SUBJECTIVE: Patient is following up regarding osteoarthritis of their Bilateral knee. They last received a corticosteroid injection approximately 3.5 months ago which gave them significant pain relief before gradually wearing off after about 2 months. No complications from previous injection. Would like another injection today to Bilateral knee. No recent events or injuries since last OV. No other complaints today. Here with her legal guardian today.        Review of Systems -   Negative except otherwise stated in HPI.        OBJECTIVE:   Alert and oriented X 3, no acute distress, respirations easy and unlabored with no audible wheezes, skin warm and dry, speech and dress appropriate for noted age, affect euthymic.    bilateral Knee Exam  Skin C/D/I. No erythema/induration/fluctuence at knee.   No effusion noted  no crepitus with flexion and extension of the knee  both Medial and Lateral joint line TTP  Active range of motion 0-115 °   Compartments soft and compressible throughout leg  Calf soft and nontender with palpation    Sensation intact to light touch sural, deep peroneal, superficial peroneal, saphenous, posterior tibial  nerve distributions to foot/ankle.   bilateral LE warm and well perfused       /78 (Site: Left Upper Arm, Position: Sitting, Cuff Size: Medium Adult)   Pulse 74   Temp 97.4 °F (36.3 °C) (Temporal)   Resp 20   Ht 1.524 m (5')   Wt 78.5 kg (173 lb 1 oz)   SpO2 98%   BMI 33.80 kg/m²     IMAGING REVIEW:    ONE XRAY VIEWS OF THE BILATERAL KNEES     5/15/2024 9:05 am     COMPARISON:  05/31/2023     HISTORY:  ORDERING SYSTEM PROVIDED HISTORY: Primary osteoarthritis of right knee     FINDINGS:  Imaging of the knees bilaterally reveal narrowing of the lateral compartment.  Spurring of the femoral condyles.  There is

## 2025-04-28 ENCOUNTER — TELEPHONE (OUTPATIENT)
Dept: ORTHOPEDIC SURGERY | Age: 40
End: 2025-04-28

## 2025-06-11 ENCOUNTER — OFFICE VISIT (OUTPATIENT)
Age: 40
End: 2025-06-11

## 2025-06-11 VITALS — OXYGEN SATURATION: 99 % | DIASTOLIC BLOOD PRESSURE: 71 MMHG | HEART RATE: 80 BPM | SYSTOLIC BLOOD PRESSURE: 118 MMHG

## 2025-06-11 DIAGNOSIS — M17.12 PRIMARY OSTEOARTHRITIS OF LEFT KNEE: ICD-10-CM

## 2025-06-11 DIAGNOSIS — M17.11 PRIMARY OSTEOARTHRITIS OF RIGHT KNEE: Primary | ICD-10-CM

## 2025-06-11 RX ORDER — TRIAMCINOLONE ACETONIDE 40 MG/ML
40 INJECTION, SUSPENSION INTRA-ARTICULAR; INTRAMUSCULAR ONCE
Status: COMPLETED | OUTPATIENT
Start: 2025-06-11 | End: 2025-06-11

## 2025-06-11 RX ORDER — BUPIVACAINE HYDROCHLORIDE 2.5 MG/ML
6 INJECTION, SOLUTION INFILTRATION; PERINEURAL ONCE
Status: COMPLETED | OUTPATIENT
Start: 2025-06-11 | End: 2025-06-11

## 2025-06-11 RX ADMIN — TRIAMCINOLONE ACETONIDE 40 MG: 40 INJECTION, SUSPENSION INTRA-ARTICULAR; INTRAMUSCULAR at 08:27

## 2025-06-11 RX ADMIN — BUPIVACAINE HYDROCHLORIDE 15 MG: 2.5 INJECTION, SOLUTION INFILTRATION; PERINEURAL at 08:26

## 2025-06-11 NOTE — PROGRESS NOTES
Chief Complaint   Patient presents with    joint injections     Bilateral knee CSI. Pt had last CSI 03/05/2025. The patient ambulates with assistance. The patient states constant pain in knee and feet 10/10.       SUBJECTIVE: Patient is following up regarding osteoarthritis of their Bilateral knee. They last received a corticosteroid injection approximately 3 months ago which gave them significant pain relief before gradually wearing off after about 2 months. No complications from previous injection. Would like another injection today to Bilateral knee. No recent events or injuries since last OV. No other complaints today. Here with legal guardian.         Review of Systems -   Negative except otherwise stated in HPI.        OBJECTIVE:   Alert and oriented X 3, no acute distress, respirations easy and unlabored with no audible wheezes, skin warm and dry, speech and dress appropriate for noted age, affect euthymic.    bilateral Knee Exam  Skin C/D/I. No erythema/induration/fluctuence at knee.   No effusion noted  mild crepitus with flexion and extension of the knee  both Medial and Lateral joint line TTP  Active range of motion 0-120 °   Compartments soft and compressible throughout leg  Calf soft and nontender with palpation    Sensation intact to light touch sural, deep peroneal, superficial peroneal, saphenous, posterior tibial  nerve distributions to foot/ankle.   bilateral LE warm and well perfused       /71 (BP Site: Right Upper Arm, Patient Position: Sitting, BP Cuff Size: Medium Adult)   Pulse 80   SpO2 99%     ASSESSMENT:     Diagnosis Orders   1. Primary osteoarthritis of right knee  BUPivacaine (MARCAINE) 0.25 % injection 15 mg    triamcinolone acetonide (KENALOG-40) injection 40 mg    triamcinolone acetonide (KENALOG-40) injection 40 mg      2. Primary osteoarthritis of left knee  BUPivacaine (MARCAINE) 0.25 % injection 15 mg    triamcinolone acetonide (KENALOG-40) injection 40 mg    triamcinolone

## (undated) DEVICE — GOWN,AURORA,BRTHSLV,2XL,18/CS: Brand: MEDLINE

## (undated) DEVICE — DRESSING,GAUZE,XEROFORM,CURAD,5"X9",ST: Brand: CURAD

## (undated) DEVICE — ZIMMER® STERILE DISPOSABLE TOURNIQUET CUFF WITH PLC, DUAL PORT, SINGLE BLADDER, 30 IN. (76 CM)

## (undated) DEVICE — BIT DRILL TROCAR LONG F/ NAILS 8-11 SILE

## (undated) DEVICE — TUBING SUCT 12FR MAL ALUM SHFT FN CAP VENT UNIV CONN W/ OBT

## (undated) DEVICE — SLEEVE PROTEC E51 V52 FLEX LG FOR NL 8-11 ST

## (undated) DEVICE — PADDING,UNDERCAST,COTTON, 4"X4YD STERILE: Brand: MEDLINE

## (undated) DEVICE — Device

## (undated) DEVICE — SCREW BNE L40MM DIA3.5MM CORT S STL ST NONCANNULATED LOK
Type: IMPLANTABLE DEVICE | Site: TIBIA | Status: NON-FUNCTIONAL
Removed: 2023-01-21

## (undated) DEVICE — BIT DRL L110MM DIA3.5MM QUIK CPL W/O STP REUSE

## (undated) DEVICE — SOLUTION IRRIG 3000ML 0.9% SOD CHL USP UROMATIC PLAS CONT

## (undated) DEVICE — GLOVE ORANGE PI 7 1/2   MSG9075

## (undated) DEVICE — BIT DRILL CALIBRATED 4.2 EX-LONG

## (undated) DEVICE — BIT DRL L145MM DIA4.2MM NONSTERILE 3 FLUT NDL PNT QUIK CPL

## (undated) DEVICE — 3M™ IOBAN™ 2 ANTIMICROBIAL INCISE DRAPE 6650EZ: Brand: IOBAN™ 2

## (undated) DEVICE — LOWER EXT KNEE DRAPE: Brand: MEDLINE INDUSTRIES, INC.

## (undated) DEVICE — BIT DRL L200MM DIA2.8MM CALIB L100MM FOR 3.5MM VA LCP PROX

## (undated) DEVICE — CHLORAPREP 26ML ORANGE

## (undated) DEVICE — BANDAGE COMPR W4INXL10YD WHITE/BEIGE E MTRX HK LOOP CLSR

## (undated) DEVICE — SOLUTION IV IRRIG POUR BRL 0.9% SODIUM CHL 2F7124

## (undated) DEVICE — SOLUTION IRRIG 1000ML STRL H2O USP PLAS POUR BTL

## (undated) DEVICE — 1000 S-DRAPE TOWEL DRAPE 10/BX: Brand: STERI-DRAPE™

## (undated) DEVICE — GLOVE ORANGE PI 8 1/2   MSG9085

## (undated) DEVICE — PADDING CAST W6INXL4YD COT LO LINTING WYTEX

## (undated) DEVICE — C-ARMOR C-ARM EQUIPMENT COVERS CLEAR STERILE UNIVERSAL FIT 12 PER CASE: Brand: C-ARMOR

## (undated) DEVICE — BIT DRL L110MM DIA2.5MM G QUIK CPL W/O STP REUSE

## (undated) DEVICE — GAMMEX® NON-LATEX PI ORTHO SIZE 8, STERILE POLYISOPRENE POWDER-FREE SURGICAL GLOVE: Brand: GAMMEX

## (undated) DEVICE — ROD RMR L950MM DIA2.5MM W/ EXTN BALL TIP

## (undated) DEVICE — GLOVE ORANGE PI 8   MSG9080